# Patient Record
Sex: MALE | Race: WHITE | NOT HISPANIC OR LATINO | Employment: OTHER | URBAN - METROPOLITAN AREA
[De-identification: names, ages, dates, MRNs, and addresses within clinical notes are randomized per-mention and may not be internally consistent; named-entity substitution may affect disease eponyms.]

---

## 2020-08-25 ENCOUNTER — HOSPITAL ENCOUNTER (OUTPATIENT)
Dept: OTHER | Facility: HOSPITAL | Age: 45
Discharge: HOME | End: 2020-08-25
Attending: FAMILY MEDICINE

## 2020-08-25 ENCOUNTER — OFFICE VISIT CONVERTED (OUTPATIENT)
Dept: FAMILY MEDICINE CLINIC | Age: 45
End: 2020-08-25
Attending: FAMILY MEDICINE

## 2020-08-25 LAB
ALBUMIN SERPL-MCNC: 4.5 G/DL (ref 3.5–5)
ALBUMIN/GLOB SERPL: 1.7 {RATIO} (ref 1.4–2.6)
ALP SERPL-CCNC: 68 U/L (ref 53–128)
ALT SERPL-CCNC: 37 U/L (ref 10–40)
ANION GAP SERPL CALC-SCNC: 17 MMOL/L (ref 8–19)
APPEARANCE UR: CLEAR
AST SERPL-CCNC: 25 U/L (ref 15–50)
BACTERIA UR QL AUTO: NORMAL
BASOPHILS # BLD MANUAL: 0.02 10*3/UL (ref 0–0.2)
BASOPHILS NFR BLD MANUAL: 0.3 % (ref 0–3)
BILIRUB SERPL-MCNC: 0.26 MG/DL (ref 0.2–1.3)
BILIRUB UR QL: NEGATIVE
BUN SERPL-MCNC: 11 MG/DL (ref 5–25)
BUN/CREAT SERPL: 10 {RATIO} (ref 6–20)
CALCIUM SERPL-MCNC: 9.9 MG/DL (ref 8.7–10.4)
CASTS URNS QL MICRO: NORMAL /[LPF]
CHLORIDE SERPL-SCNC: 102 MMOL/L (ref 99–111)
CHOLEST SERPL-MCNC: 189 MG/DL (ref 107–200)
CHOLEST/HDLC SERPL: 5.1 {RATIO} (ref 3–6)
COLOR UR: YELLOW
CONV CO2: 25 MMOL/L (ref 22–32)
CONV LEUKOCYTE ESTERASE: NEGATIVE
CONV TOTAL PROTEIN: 7.1 G/DL (ref 6.3–8.2)
CONV UROBILINOGEN IN URINE BY AUTOMATED TEST STRIP: 0.2 {EHRLICHU}/DL (ref 0.1–1)
CREAT UR-MCNC: 1.05 MG/DL (ref 0.7–1.2)
DEPRECATED RDW RBC AUTO: 39.5 FL
EOSINOPHIL # BLD MANUAL: 0.05 10*3/UL (ref 0–0.7)
EOSINOPHIL NFR BLD MANUAL: 0.7 % (ref 0–7)
EPI CELLS #/AREA URNS HPF: NORMAL /[HPF]
ERYTHROCYTE [DISTWIDTH] IN BLOOD BY AUTOMATED COUNT: 12.6 % (ref 11.5–14.5)
GFR SERPLBLD BASED ON 1.73 SQ M-ARVRAT: >60 ML/MIN/{1.73_M2}
GLOBULIN UR ELPH-MCNC: 2.6 G/DL (ref 2–3.5)
GLUCOSE 24H UR-MCNC: NEGATIVE MG/DL
GLUCOSE SERPL-MCNC: 96 MG/DL (ref 70–99)
GRANS (ABSOLUTE): 4.58 10*3/UL (ref 2–8)
GRANS: 66.8 % (ref 30–85)
HBA1C MFR BLD: 15.5 G/DL (ref 14–18)
HCT VFR BLD AUTO: 47 % (ref 42–52)
HDLC SERPL-MCNC: 37 MG/DL (ref 40–60)
HGB UR QL STRIP: NEGATIVE
IMM GRANULOCYTES # BLD: 0.01 10*3/UL (ref 0–0.54)
IMM GRANULOCYTES NFR BLD: 0.1 % (ref 0–0.43)
KETONES UR QL STRIP: NEGATIVE MG/DL
LDLC SERPL CALC-MCNC: 120 MG/DL (ref 70–100)
LYMPHOCYTES # BLD MANUAL: 1.72 10*3/UL (ref 1–5)
LYMPHOCYTES NFR BLD MANUAL: 7 % (ref 3–10)
MCH RBC QN AUTO: 28 PG (ref 27–31)
MCHC RBC AUTO-ENTMCNC: 33 G/DL (ref 33–37)
MCV RBC AUTO: 85 FL (ref 80–96)
MONOCYTES # BLD AUTO: 0.48 10*3/UL (ref 0.2–1.2)
MUCOUS THREADS URNS QL MICRO: NORMAL
NITRITE UR-MCNC: NEGATIVE MG/ML
OSMOLALITY SERPL CALC.SUM OF ELEC: 289 MOSM/KG (ref 273–304)
PH UR STRIP.AUTO: 7 [PH] (ref 5–8)
PLATELET # BLD AUTO: 284 10*3/UL (ref 130–400)
PMV BLD AUTO: 9.5 FL (ref 7.4–10.4)
POTASSIUM SERPL-SCNC: 4.2 MMOL/L (ref 3.5–5.3)
PROT UR-MCNC: NEGATIVE MG/DL
RBC # BLD AUTO: 5.53 10*6/UL (ref 4.7–6.1)
RBC # BLD AUTO: NORMAL /[HPF]
SODIUM SERPL-SCNC: 140 MMOL/L (ref 135–147)
SP GR UR STRIP: 1.02 (ref 1–1.03)
SPECIMEN SOURCE: NORMAL
TESTOST SERPL-MCNC: 179 NG/DL (ref 249–836)
TRIGL SERPL-MCNC: 161 MG/DL (ref 40–150)
TSH SERPL-ACNC: 1.98 M[IU]/L (ref 0.27–4.2)
UNIDENT CRYS URNS QL MICRO: NORMAL /[HPF]
VARIANT LYMPHS NFR BLD MANUAL: 25.1 % (ref 20–45)
VLDLC SERPL-MCNC: 32 MG/DL (ref 5–37)
WBC # BLD AUTO: 6.86 10*3/UL (ref 4.8–10.8)
WBC #/AREA URNS HPF: NORMAL /[HPF]

## 2020-08-27 LAB
ASO AB SERPL-ACNC: 36 [IU]/ML (ref 0–200)
B BURGDOR IGG+IGM SER-ACNC: <0.91 ISR (ref 0–0.9)
CONV RHEUMATOID FACTOR IGM: <10 [IU]/ML (ref 0–14)
CRP SERPL-MCNC: 1.1 MG/L (ref 0–5)
DSDNA AB SER-ACNC: NEGATIVE [IU]/ML
ENA AB SER IA-ACNC: NEGATIVE {RATIO}
ERYTHROCYTE [SEDIMENTATION RATE] IN BLOOD: 2 MM/H (ref 0–20)
PHOSPHATE SERPL-MCNC: 4.3 MG/DL (ref 2.4–4.5)
URATE SERPL-MCNC: 5.8 MG/DL (ref 3.5–8.5)

## 2020-08-28 LAB
CCP IGA+IGG SERPL IA-ACNC: 6 UNITS (ref 0–19)
SARS-COV-2 AB SERPL QL IA: NEGATIVE

## 2021-01-05 ENCOUNTER — OFFICE VISIT CONVERTED (OUTPATIENT)
Dept: FAMILY MEDICINE CLINIC | Age: 46
End: 2021-01-05
Attending: FAMILY MEDICINE

## 2021-01-06 ENCOUNTER — HOSPITAL ENCOUNTER (OUTPATIENT)
Dept: PHYSICAL THERAPY | Facility: CLINIC | Age: 46
Setting detail: RECURRING SERIES
Discharge: HOME | End: 2021-03-31
Attending: FAMILY MEDICINE

## 2021-04-27 ENCOUNTER — HOSPITAL ENCOUNTER (OUTPATIENT)
Dept: OTHER | Facility: HOSPITAL | Age: 46
Discharge: HOME | End: 2021-04-27
Attending: FAMILY MEDICINE

## 2021-04-27 ENCOUNTER — OFFICE VISIT CONVERTED (OUTPATIENT)
Dept: FAMILY MEDICINE CLINIC | Age: 46
End: 2021-04-27
Attending: FAMILY MEDICINE

## 2021-04-27 LAB
ALBUMIN SERPL-MCNC: 4.6 G/DL (ref 3.5–5)
ALBUMIN/GLOB SERPL: 1.8 {RATIO} (ref 1.4–2.6)
ALP SERPL-CCNC: 62 U/L (ref 53–128)
ALT SERPL-CCNC: 32 U/L (ref 10–40)
ANION GAP SERPL CALC-SCNC: 14 MMOL/L (ref 8–19)
AST SERPL-CCNC: 25 U/L (ref 15–50)
BASOPHILS # BLD MANUAL: 0.01 10*3/UL (ref 0–0.2)
BASOPHILS NFR BLD MANUAL: 0.1 % (ref 0–3)
BILIRUB SERPL-MCNC: 0.26 MG/DL (ref 0.2–1.3)
BUN SERPL-MCNC: 13 MG/DL (ref 5–25)
BUN/CREAT SERPL: 12 {RATIO} (ref 6–20)
CALCIUM SERPL-MCNC: 9.5 MG/DL (ref 8.7–10.4)
CHLORIDE SERPL-SCNC: 103 MMOL/L (ref 99–111)
CHOLEST SERPL-MCNC: 167 MG/DL (ref 107–200)
CHOLEST/HDLC SERPL: 4.2 {RATIO} (ref 3–6)
CONV CO2: 28 MMOL/L (ref 22–32)
CONV TOTAL PROTEIN: 7.2 G/DL (ref 6.3–8.2)
CREAT UR-MCNC: 1.13 MG/DL (ref 0.7–1.2)
DEPRECATED RDW RBC AUTO: 41.9 FL
EOSINOPHIL # BLD MANUAL: 0.09 10*3/UL (ref 0–0.7)
EOSINOPHIL NFR BLD MANUAL: 1.3 % (ref 0–7)
ERYTHROCYTE [DISTWIDTH] IN BLOOD BY AUTOMATED COUNT: 13 % (ref 11.5–14.5)
ERYTHROCYTE [SEDIMENTATION RATE] IN BLOOD: 4 MM/H (ref 0–20)
FSH SERPL-ACNC: 8.8 M[IU]/ML
GFR SERPLBLD BASED ON 1.73 SQ M-ARVRAT: >60 ML/MIN/{1.73_M2}
GLOBULIN UR ELPH-MCNC: 2.6 G/DL (ref 2–3.5)
GLUCOSE SERPL-MCNC: 92 MG/DL (ref 70–99)
GRANS (ABSOLUTE): 4.71 10*3/UL (ref 2–8)
GRANS: 69.9 % (ref 30–85)
HBA1C MFR BLD: 15.2 G/DL (ref 14–18)
HCT VFR BLD AUTO: 46.3 % (ref 42–52)
HDLC SERPL-MCNC: 40 MG/DL (ref 40–60)
IMM GRANULOCYTES # BLD: 0.01 10*3/UL (ref 0–0.54)
IMM GRANULOCYTES NFR BLD: 0.1 % (ref 0–0.43)
LDLC SERPL CALC-MCNC: 102 MG/DL (ref 70–100)
LH SERPL-ACNC: 6.7 M[IU]/ML
LYMPHOCYTES # BLD MANUAL: 1.5 10*3/UL (ref 1–5)
LYMPHOCYTES NFR BLD MANUAL: 6.4 % (ref 3–10)
MCH RBC QN AUTO: 28.5 PG (ref 27–31)
MCHC RBC AUTO-ENTMCNC: 32.8 G/DL (ref 33–37)
MCV RBC AUTO: 86.9 FL (ref 80–96)
MONOCYTES # BLD AUTO: 0.43 10*3/UL (ref 0.2–1.2)
OSMOLALITY SERPL CALC.SUM OF ELEC: 290 MOSM/KG (ref 273–304)
PLATELET # BLD AUTO: 251 10*3/UL (ref 130–400)
PMV BLD AUTO: 9.1 FL (ref 7.4–10.4)
POTASSIUM SERPL-SCNC: 4.9 MMOL/L (ref 3.5–5.3)
RBC # BLD AUTO: 5.33 10*6/UL (ref 4.7–6.1)
SODIUM SERPL-SCNC: 140 MMOL/L (ref 135–147)
TESTOST SERPL-MCNC: 288 NG/DL (ref 249–836)
TRIGL SERPL-MCNC: 124 MG/DL (ref 40–150)
TSH SERPL-ACNC: 1.99 M[IU]/L (ref 0.27–4.2)
VARIANT LYMPHS NFR BLD MANUAL: 22.2 % (ref 20–45)
VLDLC SERPL-MCNC: 25 MG/DL (ref 5–37)
WBC # BLD AUTO: 6.75 10*3/UL (ref 4.8–10.8)

## 2021-04-28 LAB — SHBG SERPL-SCNC: 25.7 NMOL/L (ref 16.5–55.9)

## 2021-05-13 ENCOUNTER — HOSPITAL ENCOUNTER (OUTPATIENT)
Dept: OTHER | Facility: HOSPITAL | Age: 46
Discharge: HOME | End: 2021-05-13
Attending: FAMILY MEDICINE

## 2021-05-18 NOTE — PROGRESS NOTES
Robbin Myers  1975     Office/Outpatient Visit    Visit Date: Tue, Aug 25, 2020 12:02 pm    Provider: Collin Ford MD (Assistant: Andra Collins, )    Location: [facility]        Electronically signed by Collin Ford MD on  08/26/2020 05:41:56 PM                             Subjective:        CC: physical exam    HPI:       Robbin is in today for wellness exam.  He is 45 years old and works for himself.  He has been finding some increased issues recently with his anxiety issues.  He does not feel as well physically as he would like.  He wants to just get a good check up. He would like to feel better.    ROS:     CONSTITUTIONAL:  Positive for fatigue.   Negative for chills or fever.      EYES:  Negative for blurred vision and eye pain.      E/N/T:  Negative for nasal congestion and sore throat.      CARDIOVASCULAR:  Positive for chest pain ( with exertion ) and palpitations.      RESPIRATORY:  Positive for chronic cough.   Negative for recent cough or dyspnea.      GASTROINTESTINAL:  Negative for abdominal pain, nausea and vomiting.      GENITOURINARY:  Negative for dysuria and hematuria.      MUSCULOSKELETAL:  Positive for back pain.   Negative for arthralgias or myalgias.      INTEGUMENTARY:  Negative for atypical mole(s) and rash.      NEUROLOGICAL:  Negative for paresthesias and weakness.      HEMATOLOGIC/LYMPHATIC:  Negative for easy bruising and excessive bleeding.      ALLERGIC/IMMUNOLOGIC:  Positive for seasonal allergies and perennial allergies.      PSYCHIATRIC:  Positive for anxiety and depression.          Past Medical History / Family History / Social History:         Last Reviewed on 8/25/2020 12:20 PM by Collin Ford    Past Medical History:             PAST MEDICAL HISTORY         Hypertension     Anxiety         Surgical History:         Arthroscopy: L Knee;         Family History:     Father: Hyperlipidemia; Hypertension; Hypothyroidism;  Arrhythmia      Mother:;  Anxiety         Social History:     Occupation: [employer]     Marital Status:      Children: 1 child         Tobacco/Alcohol/Supplements:     Last Reviewed on 8/25/2020 12:20 PM by Collin Ford    Tobacco: He has never smoked.          Alcohol: Frequency: Once a week When he drinks, the average quantity of alcohol is 2-3 drinks.   He typically consumes beer or bourbon.          Substance Abuse History:     Last Reviewed on 8/25/2020 12:20 PM by Collin Ford        Mental Health History:     Last Reviewed on 8/25/2020 12:20 PM by Collin Ford        Communicable Diseases (eg STDs):     Last Reviewed on 8/25/2020 12:20 PM by Collin Ford        Current Problems:     Last Reviewed on 8/25/2020 12:20 PM by Collin Ford    None Recorded        Immunizations:     None        Allergies:     Last Reviewed on 8/25/2020 12:20 PM by Collin Ford    No Known Allergies.        Current Medications:     Last Reviewed on 8/25/2020 12:20 PM by Collin Ford    FLUOXETINE 20MG CAPSULES [TK 1 C PO QD]    LOSARTAN 100MG TABLETS [TK 1 T PO D]    AMLODIPINE BESYLATE 10MG TABLETS [TK 1 T PO QD]    ALPRAZOLAM ER 0.5MG TABLETS [TK 1 T PO 1 TO 2 TIMES A DAY]        Objective:        Vitals:         Current: 8/25/2020 12:10:22 PM    Ht:  6 ft, 3 in;  Wt: 260.4 lbs;  BMI: 32.5T: 97.1 F;  BP: 124/79 mm Hg (left arm, sitting);  P: 71 bpm (left arm (BP Cuff), sitting)        Exams:     PHYSICAL EXAM:     GENERAL: Vitals recorded well developed, well nourished;     EYES: extraocular movements intact; conjunctiva and cornea are normal; PERRL;     E/N/T: EARS:  normal external auditory canals and tympanic membranes;  grossly normal hearing; OROPHARYNX:  normal mucosa, dentition, gingiva, and posterior pharynx;     NECK: range of motion is normal; thyroid is non-palpable;     RESPIRATORY: normal respiratory rate and pattern with no distress; normal breath  sounds with no rales, rhonchi, wheezes or rubs;     CARDIOVASCULAR: normal rate; rhythm is regular;  no systolic murmur;     GASTROINTESTINAL: nontender; normal bowel sounds; no masses;     LYMPHATIC: no enlargement of cervical or facial nodes; no supraclavicular nodes;     SKIN:  no significant rashes or lesions; no suspicious moles;     MUSCULOSKELETAL: there is not any focal finding on exam - no joint redness or effusion;     NEUROLOGIC: mental status: alert and oriented x 3; cranial nerves II-XII grossly intact;     PSYCHIATRIC: affect/demeanor: depressed;  normal psychomotor function;         Lab/Test Results:         LABORATORY RESULTS: EKG performed by tls         Assessment:         Z00.00   Encounter for general adult medical examination without abnormal findings       R53.83   Other fatigue       R07.9   Chest pain, unspecified       I10   Essential (primary) hypertension       M54.5   Low back pain       Z11.59   Encounter for screening for other viral diseases           ORDERS:         Meds Prescribed:       [Refilled] amLODIPine 10 mg oral tablet [take 1 tablet (10 mg) by oral route once daily], #90 (ninety) tablets, Refills: 1 (one)       [Refilled] losartan 100 mg oral tablet [take 1 tablet (100 mg) by oral route once daily], #90 (ninety) tablets, Refills: 1 (one)         Radiology/Test Orders:       [id number]  Electrocardiogram, routine with at least 12 leads; with interpretation and report  (In-House)            46615  Radiologic exam chest 2 views  (Send-Out)            73531  Radiologic examination, spine, lumbosacral;  minimum of four views  (Send-Out)              Lab Orders:       [id number]  PHYSF - [facility] PHYSICAL: CMP, CBC, TSH, LIPID: [id number], [id number], [id number], [id number]  (Send-Out)            51129  LYSCR - [facility] Lyme Ab/Western Blot Reflex  (Send-Out)            61469  BDUAM - [facility] Urinalysis, automated, with micro  (Send-Out)            94693  TESTB - [facility] Testosterone, total  (Send-Out)            02131  RAPII -  [facility] Arthritis Profile  (Send-Out)            76260  CCPA - [facility]- CCP Antibody  (Send-Out)              Other Orders:         Depression screen positive and follow up plan documented  (In-House)                      Plan:         Encounter for general adult medical examination without abnormal findings    LABORATORY:  Labs ordered to be performed today include PHYSICAL PANEL; CMP, CBC, TSH, LIPID and Testosterone total.      RECOMMENDATIONS given include: Today, we have reviewed Robbin's care.  I'm going to recommend getting testing as noted below.  We will see what his testing shows and then follow up.  I anticipate moving ahead with some additional testing - possibly stress test..  MIPS PHQ-9 Depression Screening: Completed form scanned and in chart; Total Score 16 Positive Depression Screen: Suicide Risk Assessment completed--denies suicidal/homicidal ideation; Referral will be initated to provider qualified to treat depression           Orders:       [id number]  PHYSF - [facility] PHYSICAL: CMP, CBC, TSH, LIPID: [id number], [id number], [id number], 20711  (Send-Out)            75554  TESTB - [facility] Testosterone, total  (Send-Out)              Depression screen positive and follow up plan documented  (In-House)              Other fatigue    LABORATORY:  Labs ordered to be performed today include Lyme Ab/Western Blot Reflex.            Orders:       [id number]  LYSCR - [facility] Lyme Ab/Western Blot Reflex  (Send-Out)              Chest pain, unspecified        RADIOLOGY:  I have ordered a chest x-ray (PA and lateral) to be done today.      TESTS/PROCEDURES:  Will proceed with an ECG to be performed/scheduled now.            Orders:       [id number]  Electrocardiogram, routine with at least 12 leads; with interpretation and report  (In-House)            95144  Radiologic exam chest 2 views  (Send-Out)              Essential (primary) hypertensionAs above.          Prescriptions:       [Refilled] amLODIPine 10 mg oral tablet [take 1 tablet (10 mg) by  oral route once daily], #90 (ninety) tablets, Refills: 1 (one)       [Refilled] losartan 100 mg oral tablet [take 1 tablet (100 mg) by oral route once daily], #90 (ninety) tablets, Refills: 1 (one)         Low back pain    LABORATORY:  Labs ordered to be performed today include Arthritis Profile, CCP Antibody, and urinalysis with micro.      RADIOLOGY:  I have ordered Lumbar/Sacral Spine X-ray to be done today.            Orders:       [id number]  BDUAM - [facility] Urinalysis, automated, with micro  (Send-Out)            40482  RAPII - [facility] Arthritis Profile  (Send-Out)            49021  CCPA - [facility]- CCP Antibody  (Send-Out)            73312  Radiologic examination, spine, lumbosacral;  minimum of four views  (Send-Out)              Encounter for screening for other viral diseases          Orders: COVIG            Patient Recommendations:        For  Low back pain:    I also recommend ^.              Charge Capture:         Primary Diagnosis:     Z00.00  Encounter for general adult medical examination without abnormal findings           Orders:      [id number]  Preventive medicine, new patient, age 40-64 years  (In-House)              Depression screen positive and follow up plan documented  (In-House)              R53.83  Other fatigue     R07.9  Chest pain, unspecified           Orders:      75220  Electrocardiogram, routine with at least 12 leads; with interpretation and report  (In-House)              I10  Essential (primary) hypertension     M54.5  Low back pain     Z11.59  Encounter for screening for other viral diseases

## 2021-05-18 NOTE — PROGRESS NOTES
Robbin Myers  1975     Office/Outpatient Visit    Visit Date: Tue, Jan 5, 2021 11:10 am    Provider: Collin Ford MD (Assistant: Heather Richards MA)    Location: [facility]        Electronically signed by Collin Ford MD on  01/06/2021 07:36:54 PM                             Subjective:        CC: 'my lower back'    HPI:       Robbin is in today for follow up on his low back.  He has had ongoing problem with pain across the lower back.  It is worse on the L side.  He did have MRI some time ago and ended up seeing a back doctor in [place].  No surgery was recommended or corrective procedure.  He did no physical therapy at that time.  The pain varies in the back but it can be severe.  He does get relief with laying down.  It is 1/10 sitting here now.  His pain is worsened with certain activities - bending over, stretching the legs when seated.  He is taking some ibuprofen maybe on a weekly basis.  He says that he tries to 'gut it out'.  He does have some radiating pain to both buttocks.          Concerning essential (primary) hypertension, his current cardiac medication regimen includes a calcium channel blocker ( Norvasc ) and an angiotensin receptor blocker ( Cozaar ).  He is tolerating the medication well without side effects.  Compliance with treatment has been good; he takes his medication as directed and follows up as directed.            He has noted some sores on the mouth on the L side.  He has had some tingling on the L side of the head.  He is not sure if those are related.  He expressed some concern that he could have shingles.    ROS:     CONSTITUTIONAL:  Negative for chills and fever.      CARDIOVASCULAR:  Negative for chest pain and palpitations.      RESPIRATORY:  Negative for recent cough and dyspnea.      GASTROINTESTINAL:  Negative for abdominal pain, nausea and vomiting.      INTEGUMENTARY:  Negative for atypical mole(s) and rash.          Past Medical  History / Family History / Social History:         Last Reviewed on 8/25/2020 12:20 PM by Collin Ford    Past Medical History:             PAST MEDICAL HISTORY         Hypertension     Anxiety         Surgical History:         Arthroscopy: L Knee;         Family History:     Father: Hyperlipidemia; Hypertension; Hypothyroidism;  Arrhythmia     Mother:;  Anxiety         Social History:     Occupation: [employer]     Marital Status:      Children: 1 child         Tobacco/Alcohol/Supplements:     Last Reviewed on 8/25/2020 12:20 PM by Collin Ford    Tobacco: He has never smoked.          Alcohol: Frequency: Once a week When he drinks, the average quantity of alcohol is 2-3 drinks.   He typically consumes beer or bourbon.          Substance Abuse History:     Last Reviewed on 8/25/2020 12:20 PM by Collin Ford        Mental Health History:     Last Reviewed on 8/25/2020 12:20 PM by Collin Ford        Communicable Diseases (eg STDs):     Last Reviewed on 8/25/2020 12:20 PM by Collin Ford        Current Problems:     Last Reviewed on 8/25/2020 12:20 PM by Collin Ford    Essential (primary) hypertension    Low back pain    Chest pain, unspecified    Other fatigue    Encounter for general adult medical examination without abnormal findings    Encounter for screening for other viral diseases    Testicular hypofunction        Immunizations:     None        Allergies:     Last Reviewed on 1/05/2021 11:12 AM by Heather Richards    No Known Allergies.        Current Medications:     Last Reviewed on 1/05/2021 11:13 AM by Heather Richards    FLUOXETINE 20MG CAPSULES  [TK 1 C PO QD]    losartan 100 mg oral tablet [take 1 tablet (100 mg) by oral route once daily]    amLODIPine 10 mg oral tablet [take 1 tablet (10 mg) by oral route once daily]    ALPRAZOLAM ER 0.5MG TABLETS  [TK 1 T PO 1 TO 2 TIMES A DAY]    DULOXETINE DR 30MG CAPSULES [TK 1 C PO 1 TIME A  DAY]        Objective:        Vitals:         Current: 1/5/2021 11:13:38 AM    Ht:  6 ft, 3 in;  Wt: 263.4 lbs;  BMI: 32.9T: 97.1 F (temporal);  BP: 144/91 mm Hg (left arm, sitting);  P: 102 bpm (left arm (BP Cuff), sitting);  sCr: 1.05 mg/dL;  GFR: 104.84        Repeat:     11:39:27 AM  BP:   143/83mm Hg (left arm, sitting, HR: 97)     Exams:     PHYSICAL EXAM:     GENERAL: Vitals recorded well developed, well nourished;     EYES: extraocular movements intact; conjunctiva and cornea are normal; PERRL;     E/N/T: EARS:  normal external auditory canals and tympanic membranes;  grossly normal hearing; OROPHARYNX:  normal mucosa, dentition, gingiva, and posterior pharynx;     NECK: range of motion is normal; thyroid is non-palpable;     RESPIRATORY: normal respiratory rate and pattern with no distress; normal breath sounds with no rales, rhonchi, wheezes or rubs;     CARDIOVASCULAR: normal rate; rhythm is regular;  no systolic murmur;     GASTROINTESTINAL: nontender; normal bowel sounds; no masses;     LYMPHATIC: no enlargement of cervical or facial nodes; no supraclavicular nodes;     SKIN:  no significant rashes or lesions; no suspicious moles;     MUSCULOSKELETAL: there is decreased ROM in the lower back on exam - no focal finding is noted - no pain in the mid line;     NEUROLOGIC: mental status: alert and oriented x 3; cranial nerves II-XII grossly intact;     PSYCHIATRIC: appropriate affect and demeanor; normal psychomotor function;         Assessment:         M54.5   Low back pain       I10   Essential (primary) hypertension       R20.2   Paresthesia of skin       Z23   Encounter for immunization       R07.9   Chest pain, unspecified       E29.1   Testicular hypofunction           ORDERS:         Meds Prescribed:       [New Rx] meloxicam 15 mg oral tablet [take 1 tablet (15 mg) by oral route once daily with food], #30 (thirty) tablets, Refills: 0 (zero)       [New Rx] cyclobenzaprine 10 mg oral tablet [take 1  tablet (10 mg) by oral route once daily at bedtime as needed], #30 (thirty) tablets, Refills: 0 (zero)       [Refilled] amLODIPine 10 mg oral tablet [take 1 tablet (10 mg) by oral route once daily], #90 (ninety) tablets, Refills: 1 (one)       [Refilled] losartan 100 mg oral tablet [take 1 tablet (100 mg) by oral route once daily], #90 (ninety) tablets, Refills: 1 (one)         Procedures Ordered:       [id number]  Immunization administration; one vaccine  (In-House)            RFPT  Physical/Occupational Therapy Referral  (Send-Out)              Other Orders:       [id number]  Influenza virus vaccine, quadrivalent, split virus, preservative free 3 years of age & older  (In-House)                      Plan:         Low back pain        REFERRALS:  Referral initiated to physical therapy ( [facility] ) for evaluation and treatment.      RECOMMENDATIONS given include: Today, we have again reviewed his care.  I'm going to recommend referral to physical therapy.  We will also give some meloxicam and Flexeril for the relative near term.  We may consider repeat imaging, but we will reach out for a copy of his last MRI..          ADDENDUM - At the end of the visit yesterday, I told Robbin I would review his chart regarding his labs and follow up.  His testosterone level was a bit low when checked in October.  I would recommend repeat fasting labs as per the order note from October.  However, let's also repeat his lipid panel at this time as below.  I'd like to see where those numbers stand.  Also, his EKG looked essentially normal, but we had some discussion at his visit about doing a stress test.  If he is desiring to move forward with that, then please arrange plain stress test at this time.  I would advise moving forward with physical therapy as we discussed.  We will be in touch as well after I can review the MRI he had done.  Thanks. - Collin Ford MD - 1/6/21 - 08:351/6/21 11:49am pt inf, states he  will move forward with stress test if you are recommending he do so, otherwise he will hold off for now/th          Prescriptions:       [New Rx] meloxicam 15 mg oral tablet [take 1 tablet (15 mg) by oral route once daily with food], #30 (thirty) tablets, Refills: 0 (zero)       [New Rx] cyclobenzaprine 10 mg oral tablet [take 1 tablet (10 mg) by oral route once daily at bedtime as needed], #30 (thirty) tablets, Refills: 0 (zero)       [Refilled] amLODIPine 10 mg oral tablet [take 1 tablet (10 mg) by oral route once daily], #90 (ninety) tablets, Refills: 1 (one)       [Refilled] losartan 100 mg oral tablet [take 1 tablet (100 mg) by oral route once daily], #90 (ninety) tablets, Refills: 1 (one)           Orders:       RFPT  Physical/Occupational Therapy Referral  (Send-Out)              Essential (primary) hypertensionAs above.        Paresthesia of skinAs above.        Encounter for immunization          Immunizations:       73695  Immunization administration; one vaccine  (In-House)            98825  Influenza virus vaccine, quadrivalent, split virus, preservative free 3 years of age & older  (In-House)                Dose (ml): 0.5  Site: right deltoid  Route: intramuscular  Administered by: Heather Richards          : [employer]  Lot #: [id number]  Exp: 06/30/2021          NDC: [address]-81        Chest pain, unspecifiedAs above.        Testicular hypofunctionAs above.            Charge Capture:         Primary Diagnosis:     M54.5  Low back pain           Orders:      69158  Office/outpatient visit; established patient, level 4  (In-House)              I10  Essential (primary) hypertension     R20.2  Paresthesia of skin     Z23  Encounter for immunization           Orders:      55271  Immunization administration; one vaccine  (In-House)            16677  Influenza virus vaccine, quadrivalent, split virus, preservative free 3 years of age & older  (In-House)              R07.9  Chest pain,  unspecified     E29.1  Testicular hypofunction         ADDENDUMS:      ____________________________________    Addendum: 01/06/2021 07:39 PM - Collin Ford        With regard to stress test, I would recommend moving ahead with plain treadmill stress test if he is having chest pain with exertion.  Thanks.        Addendum: 01/07/2021 11:57 AM - Four, Team        forwarded to [facility]/        Addendum: 01/09/2021 07:51 AM - Collin Ford        Please let Robbin know that I have reviewed his most recent MRI from [facility].  He had more significant low back disease than I anticipated with some pinching of the spinal cord in the very low back.  Given his ongoing pain - possibly worsening pain - I would advise repeat MRI at this time to evaluate.  I want to rule out obvious progression of the findings.  It would be best to arrange the follow up MRI at [facility] for easier comparison by radiology.  Thanks.        Addendum: 01/12/2021 10:32 AM - Four, Team        lmovm inf pt, forwarded to [facility]/

## 2021-05-18 NOTE — PROGRESS NOTES
Robbin Myers  1975     Office/Outpatient Visit    Visit Date: Tue, Apr 27, 2021 11:50 am    Provider: Collin Ford MD (Assistant: Rebeca Bauman,  )    Location: [facility]        Electronically signed by Collin Ford MD on  04/28/2021 10:48:38 AM                             Subjective:        CC: I've been having trouble with focus'    HPI:       Robbin is in today for evaluation of some ongoing symptoms.  He has recently seen his psychiatrist recently and says that he had MRI recommended as a precaution.  He has been sleeping more than normal.  He has had headaches and trouble focusing - 'big time'.  He has also been having some struggle with just overall function because of how he is feeling.  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        With regard to headaches, he has been having them off and on for years.  Location of pain - center and top of head.  Frequency - every couple of days recently.  This is a change for him.  Duration - up to 5-6 hours.  Description of pain - steady - not throbbing - miserable.  Intensity - 6/10.  Radiation of pain - none.  Associated symptoms - depressed and anxious mood, some blurring of vision.  Aggravating factors - noise - wants to get away from all noise, stress.  Alleviating factors - taking a pain reliever can help.          In regard to the essential (primary) hypertension, his current cardiac medication regimen includes a calcium channel blocker ( Norvasc ) and an angiotensin receptor blocker ( Cozaar ).  He is tolerating the medication well without side effects.  Compliance with treatment has been good; he takes his medication as directed and follows up as directed.      ROS:     CONSTITUTIONAL:  Negative for chills and fever.      CARDIOVASCULAR:  Negative for chest pain and palpitations.      RESPIRATORY:  Negative for recent cough and dyspnea.      GASTROINTESTINAL:   Negative for abdominal pain, nausea and vomiting.      INTEGUMENTARY:  Negative for atypical mole(s) and rash.          Past Medical History / Family History / Social History:         Last Reviewed on 4/27/2021 12:24 PM by Collin Ford    Past Medical History:             PAST MEDICAL HISTORY         Hypertension     Anxiety         Surgical History:         Arthroscopy: L Knee;         Family History:     Father: Hyperlipidemia; Hypertension; Hypothyroidism;  Arrhythmia     Mother:;  Anxiety         Social History:     Occupation: [employer]     Marital Status:      Children: 1 child         Tobacco/Alcohol/Supplements:     Last Reviewed on 4/27/2021 12:24 PM by Collin Ford    Tobacco: He has never smoked.          Alcohol: Frequency: Once a week When he drinks, the average quantity of alcohol is 2-3 drinks.   He typically consumes beer or bourbon.          Substance Abuse History:     Last Reviewed on 4/27/2021 12:24 PM by Collin Ford        Mental Health History:     Last Reviewed on 4/27/2021 12:24 PM by Collin Ford        Communicable Diseases (eg STDs):     Last Reviewed on 4/27/2021 12:24 PM by Collin Ford        Current Problems:     Last Reviewed on 4/27/2021 12:24 PM by Collin Ford    Essential (primary) hypertension    Encounter for general adult medical examination without abnormal findings    Encounter for screening for other viral diseases    Testicular hypofunction    Encounter for immunization    Paresthesia of skin    Headache, unspecified    Other fatigue    Mixed hyperlipidemia        Immunizations:     influenza, injectable, quadrivalent, preservative free (FLUZONE QUAD [id number]) 1/5/2021    COVID-19, mRNA, LNP-S, PF, 30 mcg/0.3 mL dose 9/14/2020    COVID-19, mRNA, LNP-S, PF, 30 mcg/0.3 mL dose 10/14/2020        Allergies:     Last Reviewed on 4/27/2021 12:24 PM by Collin Ford    No Known Allergies.         Current Medications:     Last Reviewed on 4/27/2021 12:24 PM by Collin Ford    FLUOXETINE 20MG CAPSULES  [TK 1 C PO QD]    losartan 100 mg oral tablet [take 1 tablet (100 mg) by oral route once daily]    amLODIPine 10 mg oral tablet [take 1 tablet (10 mg) by oral route once daily]    ALPRAZOLAM ER 0.5MG TABLETS  [TK 1 T PO 1 TO 2 TIMES A DAY]    meloxicam 15 mg oral tablet [TAKE 1 TABLET(15 MG) BY MOUTH EVERY DAY WITH FOOD]    lamotrigine 25 mg tablet        Objective:        Vitals:         Current: 4/27/2021 11:53:09 AM    Ht:  6 ft, 3 in;  Wt: 260.2 lbs;  BMI: 32.5T: 97.4 F (temporal);  BP: 123/82 mm Hg (left arm, sitting);  P: 76 bpm (left arm (BP Cuff), sitting);  sCr: 1.05 mg/dL;  GFR: 104.29        Exams:     PHYSICAL EXAM:     GENERAL: Vitals recorded well developed, well nourished;     EYES: extraocular movements intact; conjunctiva and cornea are normal; PERRL;     E/N/T: EARS:  normal external auditory canals and tympanic membranes;  grossly normal hearing; OROPHARYNX:  normal mucosa, dentition, gingiva, and posterior pharynx;     NECK: range of motion is normal; thyroid is non-palpable;     RESPIRATORY: normal respiratory rate and pattern with no distress; normal breath sounds with no rales, rhonchi, wheezes or rubs;     CARDIOVASCULAR: normal rate; rhythm is regular;  no systolic murmur;     GASTROINTESTINAL: nontender; normal bowel sounds; no masses;     LYMPHATIC: no enlargement of cervical or facial nodes; no supraclavicular nodes;     SKIN:  no significant rashes or lesions; no suspicious moles;     NEUROLOGIC: mental status: alert and oriented x 3; cranial nerves II-XII grossly intact;     PSYCHIATRIC: appropriate affect and demeanor; normal psychomotor function;         Assessment:         R51.9   Headache, unspecified       R53.83   Other fatigue       I10   Essential (primary) hypertension       E29.1   Testicular hypofunction       E78.2   Mixed hyperlipidemia           ORDERS:          Radiology/Test Orders:       34175  MRI brain with and without contrast  (Send-Out)              Lab Orders:       [id number]  BDCBC - H CBC with 3 part diff  (Send-Out)    [facility]        06569  SED - [facility] Sedimentation rate, non-automated ESR  (Send-Out)    LUCILA Lee [facility]        00321  FSH - [facility] Follicle stimulating hormone  (Send-Out)     KATHLEEN HERNANDEZ MD - PSYCHIATRY        25051  LH - [facility] Luteinizing hormone (LH)  (Send-Out)    LUCILA HERNANDEZ MD - PSYCHIATRY        07376  TESTB - [facility] Testosterone, total  (Send-Out)    LUCILA HERNANDEZ MD - PSYCHIATRY        24184  Sex hormone binding globulin  (Send-Out)    LUCILA HERNANDEZ MD - PSYCHIATRY        04736  HTNLP - [facility] CMP AND LIPID: [id number], [id number]  (Send-Out)    [facility]UCIL[facility]        17348  TSH - [facility] TSH  (Send-Out)    [facility]                  Plan:         Headache, unspecified    LABORATORY:  Labs ordered to be performed today include CBC and ESR.      RECOMMENDATIONS given include: Today, we have reviewed Robbin's care.  I'm going to recommend no specific changes in his medications.  We will arrange additional testing as noted below.  MRI to be arranged regarding the headache..            Orders:       53026  MRI brain with and without contrast  (Send-Out)            68943  BDCBC - H CBC with 3 part diff  (Send-Out)    LUCILA Lee [facility]        00784  SED - [facility] Sedimentation rate, non-automated ESR  (Send-Out)    LUCILA Lee [facility]          Other fatigueAs above.        Essential (primary) hypertensionAs above.        Testicular hypofunction    LABORATORY:  Labs ordered to be performed today include FSH, Luteinizing Hormone, and Testosterone total.            Orders:       56727  FSH - [facility] Follicle stimulating hormone  (Send-Out)    LUCILA HERNANDEZ MD - PSYCHIATRY        02859  LH - [facility] Luteinizing hormone (LH)  (Send-Out)    LUCILA HERNANDEZ MD - PSYCHIATRY         19499  TESTB - [facility] Testosterone, total  (Send-Out)    CC KATHLEEN Lee PSY[facility]        02157  Sex hormone binding globulin  (Send-Out)    LUCILA HOWARD          Mixed hyperlipidemia    LABORATORY:  Labs ordered to be performed today include HTN/Lipid Panel: CMP, Lipid and TSH.            Orders:       77810  HTNLP - [facility] CMP AND LIPID: [id number], 87977  (Send-Out)    LUCILA Lee [facility]        15044  TSH - [facility] TSH  (Send-Out)    LUCILA HOWARD              Charge Capture:         Primary Diagnosis:     R51.9  Headache, unspecified           Orders:      35145  Office/outpatient visit; established patient, level 4  (In-House)              R53.83  Other fatigue     I10  Essential (primary) hypertension     E29.1  Testicular hypofunction     E78.2  Mixed hyperlipidemia

## 2021-07-02 VITALS
WEIGHT: 260.4 LBS | TEMPERATURE: 97.1 F | DIASTOLIC BLOOD PRESSURE: 79 MMHG | HEIGHT: 75 IN | BODY MASS INDEX: 32.38 KG/M2 | SYSTOLIC BLOOD PRESSURE: 124 MMHG | HEART RATE: 71 BPM

## 2021-07-02 VITALS
TEMPERATURE: 97.1 F | HEIGHT: 75 IN | WEIGHT: 263.4 LBS | BODY MASS INDEX: 32.75 KG/M2 | HEART RATE: 102 BPM | SYSTOLIC BLOOD PRESSURE: 143 MMHG | DIASTOLIC BLOOD PRESSURE: 83 MMHG

## 2021-07-02 VITALS
BODY MASS INDEX: 32.35 KG/M2 | HEART RATE: 76 BPM | SYSTOLIC BLOOD PRESSURE: 123 MMHG | WEIGHT: 260.2 LBS | TEMPERATURE: 97.4 F | DIASTOLIC BLOOD PRESSURE: 82 MMHG | HEIGHT: 75 IN

## 2021-08-06 ENCOUNTER — OFFICE VISIT (OUTPATIENT)
Dept: FAMILY MEDICINE CLINIC | Age: 46
End: 2021-08-06

## 2021-08-06 ENCOUNTER — LAB (OUTPATIENT)
Dept: LAB | Facility: HOSPITAL | Age: 46
End: 2021-08-06

## 2021-08-06 VITALS
WEIGHT: 264.4 LBS | DIASTOLIC BLOOD PRESSURE: 73 MMHG | HEIGHT: 75 IN | HEART RATE: 79 BPM | BODY MASS INDEX: 32.87 KG/M2 | SYSTOLIC BLOOD PRESSURE: 108 MMHG | TEMPERATURE: 98.1 F | OXYGEN SATURATION: 98 %

## 2021-08-06 DIAGNOSIS — S30.861D INSECT BITE OF ABDOMINAL WALL, SUBSEQUENT ENCOUNTER: ICD-10-CM
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
## 2021-08-06 DIAGNOSIS — R53.83 OTHER FATIGUE: ICD-10-CM

## 2021-08-06 DIAGNOSIS — S30.861D INSECT BITE OF ABDOMINAL WALL, SUBSEQUENT ENCOUNTER: Primary | ICD-10-CM

## 2021-08-06 LAB
25(OH)D3 SERPL-MCNC: 27.5 NG/ML
ALBUMIN SERPL-MCNC: 4.5 G/DL (ref 3.5–5.2)
ALBUMIN/GLOB SERPL: 2.1 G/DL
ALP SERPL-CCNC: 60 U/L (ref 39–117)
ALT SERPL W P-5'-P-CCNC: 27 U/L (ref 1–41)
ANION GAP SERPL CALCULATED.3IONS-SCNC: 7.7 MMOL/L (ref 5–15)
AST SERPL-CCNC: 18 U/L (ref 1–40)
BILIRUB SERPL-MCNC: 0.4 MG/DL (ref 0–1.2)
BUN SERPL-MCNC: 16 MG/DL (ref 6–20)
BUN/CREAT SERPL: 17.4 (ref 7–25)
CALCIUM SPEC-SCNC: 9.1 MG/DL (ref 8.6–10.5)
CHLORIDE SERPL-SCNC: 106 MMOL/L (ref 98–107)
CO2 SERPL-SCNC: 25.3 MMOL/L (ref 22–29)
CREAT SERPL-MCNC: 0.92 MG/DL (ref 0.76–1.27)
DEPRECATED RDW RBC AUTO: 40.1 FL (ref 37–54)
ERYTHROCYTE [DISTWIDTH] IN BLOOD BY AUTOMATED COUNT: 13 % (ref 12.3–15.4)
GFR SERPL CREATININE-BSD FRML MDRD: 89 ML/MIN/1.73
GLOBULIN UR ELPH-MCNC: 2.1 GM/DL
GLUCOSE SERPL-MCNC: 118 MG/DL (ref 65–99)
HCT VFR BLD AUTO: 44.4 % (ref 37.5–51)
HGB BLD-MCNC: 14.8 G/DL (ref 13–17.7)
MCH RBC QN AUTO: 28.4 PG (ref 26.6–33)
MCHC RBC AUTO-ENTMCNC: 33.3 G/DL (ref 31.5–35.7)
MCV RBC AUTO: 85.1 FL (ref 79–97)
PLATELET # BLD AUTO: 251 10*3/MM3 (ref 140–450)
PMV BLD AUTO: 9.3 FL (ref 6–12)
POTASSIUM SERPL-SCNC: 4.1 MMOL/L (ref 3.5–5.2)
PROT SERPL-MCNC: 6.6 G/DL (ref 6–8.5)
RBC # BLD AUTO: 5.22 10*6/MM3 (ref 4.14–5.8)
SODIUM SERPL-SCNC: 139 MMOL/L (ref 136–145)
WBC # BLD AUTO: 7.02 10*3/MM3 (ref 3.4–10.8)

## 2021-08-06 PROCEDURE — 80053 COMPREHEN METABOLIC PANEL: CPT

## 2021-08-06 PROCEDURE — 85027 COMPLETE CBC AUTOMATED: CPT

## 2021-08-06 PROCEDURE — 36415 COLL VENOUS BLD VENIPUNCTURE: CPT

## 2021-08-06 PROCEDURE — 99213 OFFICE O/P EST LOW 20 MIN: CPT | Performed by: NURSE PRACTITIONER

## 2021-08-06 PROCEDURE — 86038 ANTINUCLEAR ANTIBODIES: CPT

## 2021-08-06 PROCEDURE — 86617 LYME DISEASE ANTIBODY: CPT

## 2021-08-06 PROCEDURE — 86757 RICKETTSIA ANTIBODY: CPT

## 2021-08-06 PROCEDURE — 82306 VITAMIN D 25 HYDROXY: CPT

## 2021-08-06 PROCEDURE — 86225 DNA ANTIBODY NATIVE: CPT

## 2021-08-06 RX ORDER — LAMOTRIGINE 25 MG/1
TABLET ORAL
COMMUNITY
Start: 2021-04-26 | End: 2021-08-06

## 2021-08-06 RX ORDER — FLUOXETINE HYDROCHLORIDE 20 MG/1
20 CAPSULE ORAL DAILY
COMMUNITY
Start: 2021-06-14 | End: 2021-12-07

## 2021-08-06 RX ORDER — LOSARTAN POTASSIUM 100 MG/1
1 TABLET ORAL DAILY
COMMUNITY
Start: 2021-06-06 | End: 2021-09-02

## 2021-08-06 RX ORDER — ALPRAZOLAM 0.5 MG/1
1-2 TABLET, EXTENDED RELEASE ORAL DAILY PRN
COMMUNITY
Start: 2021-06-05

## 2021-08-06 RX ORDER — DOXYCYCLINE HYCLATE 100 MG/1
100 CAPSULE ORAL 2 TIMES DAILY
Qty: 20 CAPSULE | Refills: 0 | Status: SHIPPED | OUTPATIENT
Start: 2021-08-06 | End: 2021-08-16

## 2021-08-06 RX ORDER — PROPRANOLOL HYDROCHLORIDE 20 MG/1
1 TABLET ORAL DAILY
COMMUNITY
Start: 2021-05-26 | End: 2021-12-07

## 2021-08-06 RX ORDER — AMLODIPINE BESYLATE 10 MG/1
1 TABLET ORAL DAILY
COMMUNITY
Start: 2021-06-06 | End: 2021-09-10

## 2021-08-06 RX ORDER — LAMOTRIGINE 200 MG/1
200 TABLET ORAL DAILY
COMMUNITY
Start: 2021-05-26 | End: 2021-12-07

## 2021-08-06 NOTE — PROGRESS NOTES
"Chief Complaint  Insect Bite (tick 4wks ago, really sick 2 wks ago (fever, chills, fatigue, foggy brain, drowzy)) and Fatigue (currently still extremely drowzy, \"hard to think straight\")    Subjective          Robbin Myers presents to [facility]  He had a tick bite 4 weeks ago, and then traveled to [place] a few weeks after that.  He did have a 102 degree fever and went urgent care and was treated with doxycycline x 10 days. He is still having fatigue, foggy brain, and drowsiness.  He denies fever, arthralgia, and myalgia currently.  He denies N/V. He had a rash left lateral abdomen, but not a generalized rash.  He is taking his fluoxetine, lamictal, and Xanax prn.        Objective   Vital Signs:   /73   Pulse 79   Temp 98.1 °F (36.7 °C)   Ht 190.5 cm (75\")   Wt 120 kg (264 lb 6.4 oz)   SpO2 98%   BMI 33.05 kg/m²     Physical Exam  Constitutional:       General: He is not in acute distress.     Appearance: Normal appearance. He is obese.   HENT:      Head: Normocephalic.   Eyes:      Pupils: Pupils are equal, round, and reactive to light.      Visual Fields: Right eye visual fields normal and left eye visual fields normal.   Neck:      Trachea: Trachea normal.   Cardiovascular:      Rate and Rhythm: Normal rate and regular rhythm.      Heart sounds: Normal heart sounds.   Pulmonary:      Effort: Pulmonary effort is normal.      Breath sounds: Normal breath sounds and air entry.   Musculoskeletal:      Right lower leg: No edema.      Left lower leg: No edema.   Skin:     General: Skin is warm and dry.      Findings: Lesion (Left lateral abdomen- small open wound with slight erythema surrounding it) present.   Neurological:      Mental Status: He is alert and oriented to person, place, and time.   Psychiatric:         Mood and Affect: Mood and affect normal.         Behavior: Behavior normal.         Thought Content: Thought content normal.        Result Review : "                 Assessment and Plan    Diagnoses and all orders for this visit:    1. Insect bite of abdominal wall, subsequent encounter (Primary)  -     doxycycline (VIBRAMYCIN) 100 MG capsule; Take 1 capsule by mouth 2 (Two) Times a Day for 10 days.  Dispense: 20 capsule; Refill: 0  -     Hymera SF (IgG / M); Future  -     Lyme Disease, Western Blot; Future    2. Other fatigue  -     doxycycline (VIBRAMYCIN) 100 MG capsule; Take 1 capsule by mouth 2 (Two) Times a Day for 10 days.  Dispense: 20 capsule; Refill: 0  -     Hymera SF (IgG / M); Future  -     Lyme Disease, Western Blot; Future  -     DAR; Future  -     Vitamin D 25 Hydroxy; Future  -     CBC (No Diff); Future  -     Comprehensive Metabolic Panel; Future    Will try another round of doxycyline.  Will test for [place] Spotted fever and lyme disease.  Will also obtain DAR, Vit D, CBC, and CMP.    Follow Up   Return if symptoms worsen or fail to improve.  Patient was given instructions and counseling regarding his condition or for health maintenance advice. Please see specific information pulled into the AVS if appropriate.

## 2021-08-06 NOTE — PATIENT INSTRUCTIONS
Insect Bite, Adult  An insect bite can make your skin red, itchy, and swollen. An insect bite is different from an insect sting, which happens when an insect injects poison (venom) into the skin.  Some insects can spread disease to people through a bite. However, most insect bites do not lead to disease and are not serious.  What are the causes?  Insects may bite for a variety of reasons, including:  · Hunger.  · To defend themselves.  Insects that bite include:  · Spiders.  · Mosquitoes.  · Ticks.  · Fleas.  · Ants.  · Flies.  · Kissing bugs.  · Chiggers.  What are the signs or symptoms?  Symptoms of this condition include:  · Itching or pain in the bite area.  · Redness and swelling in the bite area.  · An open wound (skin ulcer).  In many cases, symptoms last for 2-4 days.  In rare cases, a person may have a severe allergic reaction (anaphylactic reaction) to a bite. Symptoms of an anaphylactic reaction may include:  · Feeling warm in the face (flushed). This may include redness.  · Itchy, red, swollen areas of skin (hives).  · Swelling of the eyes, lips, face, mouth, tongue, or throat.  · Difficulty breathing, speaking, or swallowing.  · Noisy breathing (wheezing).  · Dizziness or light-headedness.  · Fainting.  · Pain or cramping in the abdomen.  · Vomiting.  · Diarrhea.  How is this diagnosed?  This condition is usually diagnosed based on symptoms and a physical exam.  How is this treated?  Treatment is usually not needed. Symptoms often go away on their own. When treatment is recommended, it may involve:  · Applying a cream or lotion to the bite area. This treatment helps with itching.  · Taking an antibiotic medicine. This treatment is needed if the bite area gets infected.  · Getting a tetanus shot, if you are not up to date on this vaccine.  · Applying ice to the affected area.  · Allergy medicines called antihistamines. This treatment may be needed if you develop itching or an allergic reaction to the  "insect bite.  · Giving yourself an epinephrine injection if you have an anaphylactic reaction to a bite. To give the injection, you will use what is commonly called an auto-injector \"pen\" (pre-filled automatic epinephrine injection device). Your health care provider will teach you how to use an auto-injector pen.  Follow these instructions at home:  Bite area care    · Do not scratch the bite area.  · Keep the bite area clean and dry. Wash it every day with soap and water as told by your health care provider.  · Check the bite area every day for signs of infection. Check for:  ? Redness, swelling, or pain.  ? Fluid or blood.  ? Warmth.  ? Pus or a bad smell.  Managing pain, itching, and swelling    · You may apply cortisone cream, calamine lotion, or a paste made of baking soda and water to the bite area as told by your health care provider.  · If directed, put ice on the bite area.  ? Put ice in a plastic bag.  ? Place a towel between your skin and the bag.  ? Leave the ice on for 20 minutes, 2-3 times a day.  General instructions  · Apply or take over-the-counter and prescription medicines only as told by your health care provider.  · If you were prescribed an antibiotic medicine, take or apply it as told by your health care provider. Do not stop using the antibiotic even if your condition improves.  · Keep all follow-up visits as told by your health care provider. This is important.  How is this prevented?  To help reduce your risk of insect bites:  · When you are outdoors, wear clothing that covers your arms and legs. This is especially important in the early morning and evening.  · Use insect repellent. The best insect repellents contain DEET, picaridin, oil of lemon eucalyptus (OLE), or PM7116.  · Consider spraying your clothing with a pesticide called permethrin. Permethrin helps prevent insect bites. It works for several weeks and for up to 5-6 clothing washes. Do not apply permethrin directly to the " skin.  · If your home windows do not have screens, consider installing them.  · If you will be sleeping in an area where there are mosquitoes, consider covering your sleeping area with a mosquito net.  Contact a health care provider if:  · You have redness, swelling, or pain in the bite area.  · You have fluid or blood coming from the bite area.  · The bite area feels warm to the touch.  · You have pus or a bad smell coming from the bite area.  · You have a fever.  Get help right away if:  · You have joint pain.  · You have a rash.  · You feel unusually tired or sleepy.  · You have neck pain.  · You have a headache.  · You have unusual weakness.  · You develop symptoms of an anaphylactic reaction. These may include:  ? Flushed skin.  ? Hives.  ? Swelling of the eyes, lips, face, mouth, tongue, or throat.  ? Difficulty breathing, speaking, or swallowing.  ? Wheezing.  ? Dizziness or light-headedness.  ? Fainting.  ? Pain or cramping in the abdomen.  ? Vomiting.  ? Diarrhea.  These symptoms may represent a serious problem that is an emergency. Do not wait to see if the symptoms will go away. Do the following right away:  · Use the auto-injector pen as you have been instructed.  · Get medical help. Call your local emergency services (911 in the [place] Do not drive yourself to the hospital.  Summary  · An insect bite can make your skin red, itchy, and swollen.  · Treatment is usually not needed. Symptoms often go away on their own. When treatment is recommended, it may involve taking medicine, applying medicine to the area, or applying ice.  · Apply or take over-the-counter and prescription medicines only as told by your health care provider.  · Use insect repellent to help prevent insect bites.  · Contact a health care provider if you have any signs of infection in the bite area.  This information is not intended to replace advice given to you by your health care provider. Make sure you discuss any questions you have  with your health care provider.  Document Revised: 06/28/2019 Document Reviewed: 06/28/2019  Elsevier Patient Education © 2021 [employer]

## 2021-08-10 LAB
DSDNA IGG SERPL IA-ACNC: NEGATIVE [IU]/ML
NUCLEAR IGG SER IA-RTO: NEGATIVE

## 2021-08-12 LAB

## 2021-08-23 ENCOUNTER — HOSPITAL ENCOUNTER (EMERGENCY)
Dept: HOSPITAL 49 - FER | Age: 46
Discharge: HOME | End: 2021-08-23
Payer: COMMERCIAL

## 2021-08-23 DIAGNOSIS — L42: Primary | ICD-10-CM

## 2021-08-23 DIAGNOSIS — I10: ICD-10-CM

## 2021-08-23 DIAGNOSIS — Z79.899: ICD-10-CM

## 2021-08-23 LAB
ALBUMIN SERPL-MCNC: 4 G/DL (ref 3.4–5)
ALKALINE PHOSHATASE: 54 U/L (ref 46–116)
ALT SERPL-CCNC: 44 U/L (ref 16–63)
AST: 23 U/L (ref 15–37)
BASOPHIL: 0.2 % (ref 0–2)
BILIRUBIN - TOTAL: 0.3 MG/DL (ref 0.2–1)
BUN SERPL-MCNC: 11 MG/DL (ref 7–18)
BUN/CREAT RATIO (CALC): 9.9 RATIO
CHLORIDE: 102 MMOL/L (ref 98–107)
CO2 (BICARBONATE): 25 MMOL/L (ref 21–32)
CREATININE: 1.11 MG/DL (ref 0.67–1.17)
EOSINOPHIL: 1.1 % (ref 0–5)
GLOBULIN (CALCULATION): 2.9 G/DL
GLUCOSE SERPL-MCNC: 170 MG/DL (ref 74–106)
HCT: 43.5 % (ref 42–52)
HGB BLD-MCNC: 14.2 G/DL (ref 13.2–18)
LYMPHOCYTE: 37.3 % (ref 15–48)
MCH RBC QN AUTO: 28.4 PG (ref 25–31)
MCHC RBC AUTO-ENTMCNC: 32.6 G/DL (ref 32–36)
MCV: 87 FL (ref 78–100)
MONOCYTE: 7 % (ref 0–12)
MPV: 9.7 FL (ref 6–9.5)
NEUTROPHIL: 54.3 % (ref 41–80)
NRBC: 0
PLT: 237 K/UL (ref 150–400)
POTASSIUM: 3.7 MMOL/L (ref 3.5–5.1)
RBC MORPHOLOGY: NORMAL
RBC: 5 M/UL (ref 4.7–6)
RDW: 12.8 % (ref 11.5–14)
TOTAL PROTEIN: 6.9 G/DL (ref 6.4–8.2)
WBC: 8.5 K/UL (ref 4–10.5)

## 2021-08-25 ENCOUNTER — TELEPHONE (OUTPATIENT)
Dept: FAMILY MEDICINE CLINIC | Age: 46
End: 2021-08-25

## 2021-08-25 NOTE — TELEPHONE ENCOUNTER
Pt calling today to state he has a place on his abd. To the right of his navel that is red with a halo around it. He stated he went to the ER on Sunday and they dx him with Pityriasis rosea. On Monday he woke up and it was much larger, the itching has since stopped but he is concerned with all of his fatigue that this was misdiagnosed. Pt is going to try and upload a picture of this to send to pcp to review via [employer]. Inf pt to draw a [place] around area with marker and will request records from [facility].

## 2021-08-27 LAB
R RICKETTSI IGG SER QL IA: NEGATIVE
R RICKETTSI IGM SER-ACNC: 0.28 INDEX (ref 0–0.89)

## 2021-09-02 RX ORDER — LOSARTAN POTASSIUM 100 MG/1
TABLET ORAL
Qty: 90 TABLET | Refills: 0 | Status: SHIPPED | OUTPATIENT
Start: 2021-09-02 | End: 2021-11-29

## 2021-09-10 RX ORDER — AMLODIPINE BESYLATE 10 MG/1
TABLET ORAL
Qty: 90 TABLET | Refills: 0 | Status: SHIPPED | OUTPATIENT
Start: 2021-09-10 | End: 2021-12-08

## 2021-11-29 RX ORDER — LOSARTAN POTASSIUM 100 MG/1
TABLET ORAL
Qty: 30 TABLET | Refills: 0 | Status: SHIPPED | OUTPATIENT
Start: 2021-11-29 | End: 2022-01-25

## 2021-11-29 RX ORDER — LOSARTAN POTASSIUM 100 MG/1
TABLET ORAL
Qty: 90 TABLET | OUTPATIENT
Start: 2021-11-29

## 2021-12-07 ENCOUNTER — OFFICE VISIT (OUTPATIENT)
Dept: FAMILY MEDICINE CLINIC | Age: 46
End: 2021-12-07

## 2021-12-07 VITALS
SYSTOLIC BLOOD PRESSURE: 137 MMHG | DIASTOLIC BLOOD PRESSURE: 84 MMHG | HEIGHT: 75 IN | TEMPERATURE: 98.4 F | WEIGHT: 277.6 LBS | HEART RATE: 92 BPM | BODY MASS INDEX: 34.52 KG/M2 | OXYGEN SATURATION: 97 %

## 2021-12-07 DIAGNOSIS — J40 BRONCHITIS: ICD-10-CM

## 2021-12-07 DIAGNOSIS — R05.9 COUGH: Primary | ICD-10-CM

## 2021-12-07 PROCEDURE — 96372 THER/PROPH/DIAG INJ SC/IM: CPT | Performed by: NURSE PRACTITIONER

## 2021-12-07 PROCEDURE — 99212 OFFICE O/P EST SF 10 MIN: CPT | Performed by: NURSE PRACTITIONER

## 2021-12-07 RX ORDER — ALBUTEROL SULFATE 90 UG/1
2 AEROSOL, METERED RESPIRATORY (INHALATION) EVERY 4 HOURS PRN
Qty: 6.7 G | Refills: 0 | Status: SHIPPED | OUTPATIENT
Start: 2021-12-07 | End: 2022-05-10

## 2021-12-07 RX ORDER — GUAIFENESIN DEXTROMETHORPHAN HYDROBROMIDE ORAL SOLUTION 10; 100 MG/5ML; MG/5ML
5 SOLUTION ORAL 3 TIMES DAILY PRN
Qty: 236 ML | Refills: 0 | Status: SHIPPED | OUTPATIENT
Start: 2021-12-07 | End: 2022-05-10

## 2021-12-07 RX ORDER — TRIAMCINOLONE ACETONIDE 40 MG/ML
60 INJECTION, SUSPENSION INTRA-ARTICULAR; INTRAMUSCULAR ONCE
Status: COMPLETED | OUTPATIENT
Start: 2021-12-07 | End: 2021-12-07

## 2021-12-07 RX ADMIN — TRIAMCINOLONE ACETONIDE 60 MG: 40 INJECTION, SUSPENSION INTRA-ARTICULAR; INTRAMUSCULAR at 16:30

## 2021-12-07 NOTE — PROGRESS NOTES
"Chief Complaint  Cough (for more than a week; currently taking augmentin)    Subjective    Patient is a 46 year old male in today with complaints of continued cough. Patient reports symptoms began one week ago. He went to immediate care and was placed on an antibiotic 3 days ago but is not improving.          Robbin Myers presents to [facility]  Cough  This is a new problem. The current episode started in the past 7 days. The problem has been gradually worsening. The cough is productive of sputum. Associated symptoms include chills, ear pain, a fever, nasal congestion, a sore throat and shortness of breath. Pertinent negatives include no chest pain. Nothing aggravates the symptoms. The treatment provided mild relief.       Objective   Vital Signs:   /84 (BP Location: Left arm, Patient Position: Sitting)   Pulse 92   Temp 98.4 °F (36.9 °C) (Oral)   Ht 190.5 cm (75\")   Wt 126 kg (277 lb 9.6 oz)   SpO2 97% Comment: on room air  BMI 34.70 kg/m²     Physical Exam  HENT:      Head: Normocephalic.      Right Ear: Tympanic membrane, ear canal and external ear normal.      Left Ear: Tympanic membrane, ear canal and external ear normal.      Nose: Nose normal.      Mouth/Throat:      Mouth: Mucous membranes are moist.      Pharynx: Oropharynx is clear. Posterior oropharyngeal erythema present. No oropharyngeal exudate.   Cardiovascular:      Rate and Rhythm: Normal rate and regular rhythm.      Pulses: Normal pulses.      Heart sounds: Normal heart sounds.   Pulmonary:      Effort: Pulmonary effort is normal.      Breath sounds: Normal breath sounds.   Skin:     General: Skin is warm and dry.   Neurological:      Mental Status: He is alert and oriented to person, place, and time.   Psychiatric:         Mood and Affect: Mood normal.        Result Review :                 Assessment and Plan    Diagnoses and all orders for this visit:    1. Cough (Primary)  -     triamcinolone " acetonide (KENALOG-40) injection 60 mg  -     albuterol sulfate  (90 Base) MCG/ACT inhaler; Inhale 2 puffs by mouth Every 4 (Four) Hours As Needed for Wheezing or Shortness of Air.  Dispense: 6.7 g; Refill: 0  -     dextromethorphan-guaifenesin (ROBITUSSIN-DM)  MG/5ML liquid; Take 5 mL by mouth 3 (Three) Times a Day As Needed.  Dispense: 236 mL; Refill: 0    2. Bronchitis  -     albuterol sulfate  (90 Base) MCG/ACT inhaler; Inhale 2 puffs by mouth Every 4 (Four) Hours As Needed for Wheezing or Shortness of Air.  Dispense: 6.7 g; Refill: 0  -     dextromethorphan-guaifenesin (ROBITUSSIN-DM)  MG/5ML liquid; Take 5 mL by mouth 3 (Three) Times a Day As Needed.  Dispense: 236 mL; Refill: 0        Follow Up   Return if symptoms worsen or fail to improve.  Patient was given instructions and counseling regarding his condition or for health maintenance advice. Please see specific information pulled into the AVS if appropriate.

## 2021-12-08 RX ORDER — AMLODIPINE BESYLATE 10 MG/1
TABLET ORAL
Qty: 90 TABLET | Refills: 0 | Status: SHIPPED | OUTPATIENT
Start: 2021-12-08 | End: 2022-03-21

## 2022-01-25 RX ORDER — LOSARTAN POTASSIUM 100 MG/1
TABLET ORAL
Qty: 30 TABLET | Refills: 0 | Status: SHIPPED | OUTPATIENT
Start: 2022-01-25 | End: 2022-02-24

## 2022-01-26 RX ORDER — LOSARTAN POTASSIUM 100 MG/1
TABLET ORAL
Qty: 90 TABLET | OUTPATIENT
Start: 2022-01-26

## 2022-02-24 RX ORDER — LOSARTAN POTASSIUM 100 MG/1
TABLET ORAL
Qty: 30 TABLET | Refills: 0 | Status: SHIPPED | OUTPATIENT
Start: 2022-02-24 | End: 2022-02-28

## 2022-02-28 RX ORDER — LOSARTAN POTASSIUM 100 MG/1
TABLET ORAL
Qty: 30 TABLET | Refills: 0 | Status: SHIPPED | OUTPATIENT
Start: 2022-02-28 | End: 2022-04-28

## 2022-03-02 RX ORDER — LOSARTAN POTASSIUM 100 MG/1
TABLET ORAL
Qty: 90 TABLET | OUTPATIENT
Start: 2022-03-02

## 2022-03-21 RX ORDER — AMLODIPINE BESYLATE 10 MG/1
TABLET ORAL
Qty: 90 TABLET | Refills: 0 | Status: SHIPPED | OUTPATIENT
Start: 2022-03-21 | End: 2022-08-12 | Stop reason: SDUPTHER

## 2022-04-28 RX ORDER — LOSARTAN POTASSIUM 100 MG/1
TABLET ORAL
Qty: 30 TABLET | Refills: 0 | Status: SHIPPED | OUTPATIENT
Start: 2022-04-28 | End: 2022-04-28

## 2022-04-28 RX ORDER — LOSARTAN POTASSIUM 100 MG/1
100 TABLET ORAL DAILY
Qty: 90 TABLET | Refills: 0 | Status: SHIPPED | OUTPATIENT
Start: 2022-04-28 | End: 2022-08-25 | Stop reason: SDUPTHER

## 2022-05-10 ENCOUNTER — OFFICE VISIT (OUTPATIENT)
Dept: FAMILY MEDICINE CLINIC | Age: 47
End: 2022-05-10

## 2022-05-10 VITALS
HEART RATE: 73 BPM | SYSTOLIC BLOOD PRESSURE: 120 MMHG | WEIGHT: 264 LBS | BODY MASS INDEX: 32.83 KG/M2 | DIASTOLIC BLOOD PRESSURE: 84 MMHG | TEMPERATURE: 98.4 F | HEIGHT: 75 IN

## 2022-05-10 DIAGNOSIS — G89.29 CHRONIC LEFT SHOULDER PAIN: ICD-10-CM

## 2022-05-10 DIAGNOSIS — E55.9 VITAMIN D DEFICIENCY: ICD-10-CM

## 2022-05-10 DIAGNOSIS — G47.30 SLEEP APNEA, UNSPECIFIED TYPE: ICD-10-CM

## 2022-05-10 DIAGNOSIS — Z13.220 SCREENING FOR LIPID DISORDERS: ICD-10-CM

## 2022-05-10 DIAGNOSIS — M75.02 ADHESIVE CAPSULITIS OF LEFT SHOULDER: ICD-10-CM

## 2022-05-10 DIAGNOSIS — I20.8 ANGINA OF EFFORT: ICD-10-CM

## 2022-05-10 DIAGNOSIS — E66.9 OBESITY (BMI 30-39.9): ICD-10-CM

## 2022-05-10 DIAGNOSIS — R53.83 FATIGUE, UNSPECIFIED TYPE: ICD-10-CM

## 2022-05-10 DIAGNOSIS — F33.1 MODERATE EPISODE OF RECURRENT MAJOR DEPRESSIVE DISORDER: ICD-10-CM

## 2022-05-10 DIAGNOSIS — I10 PRIMARY HYPERTENSION: Primary | ICD-10-CM

## 2022-05-10 DIAGNOSIS — M25.512 CHRONIC LEFT SHOULDER PAIN: ICD-10-CM

## 2022-05-10 DIAGNOSIS — M65.4 TENOSYNOVITIS, DE QUERVAIN: ICD-10-CM

## 2022-05-10 DIAGNOSIS — Z13.1 SCREENING FOR DIABETES MELLITUS: ICD-10-CM

## 2022-05-10 DIAGNOSIS — Z00.00 ANNUAL PHYSICAL EXAM: ICD-10-CM

## 2022-05-10 PROBLEM — I20.89 ANGINA OF EFFORT: Status: ACTIVE | Noted: 2022-05-10

## 2022-05-10 PROCEDURE — 99214 OFFICE O/P EST MOD 30 MIN: CPT | Performed by: NURSE PRACTITIONER

## 2022-05-10 PROCEDURE — 93000 ELECTROCARDIOGRAM COMPLETE: CPT | Performed by: FAMILY MEDICINE

## 2022-05-10 RX ORDER — VORTIOXETINE 10 MG/1
1 TABLET, FILM COATED ORAL DAILY
COMMUNITY
Start: 2022-04-24 | End: 2022-05-25

## 2022-05-10 RX ORDER — METHYLPHENIDATE HYDROCHLORIDE 10 MG/1
TABLET ORAL
COMMUNITY
Start: 2022-03-14 | End: 2022-11-08

## 2022-05-10 RX ORDER — MULTIVIT WITH MINERALS/LUTEIN
250 TABLET ORAL DAILY
COMMUNITY
End: 2022-05-25

## 2022-05-10 RX ORDER — UBIDECARENONE 75 MG
50 CAPSULE ORAL DAILY
COMMUNITY
End: 2022-05-25

## 2022-05-10 NOTE — ASSESSMENT & PLAN NOTE
-suspect arthritis, but patient would like to proceed with MRI if xray normal since he has been dealing with this for 2 years

## 2022-05-10 NOTE — ASSESSMENT & PLAN NOTE
-discussed symptomatic treatment with pt  -NSAID therapy, wear wrist brace, avoid over use  -offered steroid boost, but pt does not like SE of steroids   -discussed possible future use of steroid injection into tendon sheath  -pt will report if loss of movement occurs in extremtiy, severe pain, or continuous numbness occurs

## 2022-05-10 NOTE — ASSESSMENT & PLAN NOTE
-referral to cardio  -discussed starting baby ASA daily   -ER precautions given  -EKG unrevealing in office   -suspect this may be r/t underlying anxiety

## 2022-05-10 NOTE — ASSESSMENT & PLAN NOTE
-Patient was counseled to eat low fat, low carb diet limiting fried food/sweets and increase fruits/vegetables/whole grains.   -light walking okay, no vigorous exercise for now

## 2022-05-10 NOTE — ASSESSMENT & PLAN NOTE
-the pt was advised to follow up with his psychiatrist for his depression  -based on his symptoms and PHQ9 score of 17, this is not currently managed well  -The patient denies thoughts of suicide, self-harm, or homicide.

## 2022-05-10 NOTE — ASSESSMENT & PLAN NOTE
-BP is well controlled  -continue losartan 100 mg and amlodipine 10 mg qday  -the patient wants to come off of these meds  -told him for now I do not think it would be appropriate especially if he has an underlying cardiac issue  -if cardiac workup neg, discussed that If he loses a significant amount of wt, stopping these meds may be possible in the future

## 2022-05-10 NOTE — PROGRESS NOTES
Procedure     ECG 12 Lead    Date/Time: 5/10/2022 3:46 PM  Performed by: Liset Arnett MD  Authorized by: Haydee Leigh APRN   Comparison: not compared with previous ECG   Rhythm: sinus rhythm  Rate: normal  Conduction: conduction normal  ST Segments: ST segments normal  T Waves: T waves normal  QRS axis: normal  Other: no other findings    Clinical impression: normal ECG  Comments: Normal EKG.  [facility]

## 2022-05-10 NOTE — PROGRESS NOTES
"Robbin Myers presents to [facility] with complaint of  Hypertension and Establish Care    SUBJECTIVE  History of Present Illness    He is here to re-establish primary care. He is , he works as a farmer. He was seeing Dr. Ford but has not seen him in over a year. The patient opens the visit by saying \"I need help, I feel like poop.\"    He has chronic medical conditions of HTN, depression, ADD. He has been on Ritalin for the past year. He says it helps in the AM, but feels it wears off. His psychiatrist told him he may take bid, but the patient only take qday. He is on Trintellix or depression but feels it is not helping.     He checks his BP at home on occasion and usually is around 130/90.     He also has left shoulder pain. This pain has been present for 2 years. Pain does not radiate. He does say he has numbness in his left hand and fingers that is worse when he wakes up in the AM, and goes away by late morning. He does say that he has left wrist pain as well.     He has not had screening colonoscopy.      He is also having chest pains that occur at random. He is wanting to be evaluated by cardiology. He says the chest pains come and go, seem to occur when he is exerting himself. The pains occurs for a few minutes then resolve completely. He says the pain does sometimes radiate to his left arm. He denies any SOA. He says he is not sure if they occur when he has anxiety.     He also uses a CPAP or BIPAP for sleep apnea. He feels that his current machine is not working correctly. He would like a referral for this to be checked.     He has never smoked, he does drink 2-3 acholic beverages per week, denies illicit drug use.     OBJECTIVE  Vital Signs:   /84 (BP Location: Right arm, Patient Position: Sitting)   Pulse 73   Temp 98.4 °F (36.9 °C) (Oral)   Ht 190.5 cm (75\")   Wt 120 kg (264 lb)   BMI 33.00 kg/m²       Physical Exam  Constitutional:       General: " He is not in acute distress.     Appearance: Normal appearance. He is obese. He is not ill-appearing.   HENT:      Head: Normocephalic and atraumatic.      Nose: Nose normal.      Mouth/Throat:      Mouth: Mucous membranes are moist.   Cardiovascular:      Rate and Rhythm: Normal rate and regular rhythm.      Pulses: Normal pulses.      Heart sounds: Normal heart sounds.   Pulmonary:      Effort: Pulmonary effort is normal. No respiratory distress.      Breath sounds: Normal breath sounds.   Chest:      Chest wall: No tenderness.   Musculoskeletal:         General: Normal range of motion.      Right shoulder: Normal.      Left shoulder: Normal. No tenderness, bony tenderness or crepitus. Normal range of motion (pain expressed with overhead adduction ).      Right wrist: Normal.      Left wrist: No swelling or crepitus. Normal range of motion.      Cervical back: Normal range of motion and neck supple.      Comments: Positive Finkelstein test left wrist. Negative phalen    Skin:     General: Skin is warm and dry.      Capillary Refill: Capillary refill takes less than 2 seconds.   Neurological:      General: No focal deficit present.      Mental Status: He is alert and oriented to person, place, and time. Mental status is at baseline.   Psychiatric:         Mood and Affect: Mood normal.         Behavior: Behavior normal.          Results Review:  The following data was reviewed by STEPHANE Smith [unfilled] 08:24 EDT.    CBC (No Diff) (08/06/2021 15:40)  Vitamin D 25 Hydroxy (08/06/2021 15:40)  Comprehensive Metabolic Panel (08/06/2021 15:40)      PHQ-9 Depression Screening  Little interest or pleasure in doing things? 2-->more than half the days   Feeling down, depressed, or hopeless? 2-->more than half the days   Trouble falling or staying asleep, or sleeping too much? 3-->nearly every day   Feeling tired or having little energy? 3-->nearly every day   Poor appetite or overeating? 3-->nearly every day   Feeling bad  about yourself - or that you are a failure or have let yourself or your family down? 1-->several days   Trouble concentrating on things, such as reading the newspaper or watching television? 3-->nearly every day   Moving or speaking so slowly that other people could have noticed? Or the opposite - being so fidgety or restless that you have been moving around a lot more than usual? 0-->not at all   Thoughts that you would be better off dead, or of hurting yourself in some way? 0-->not at all   PHQ-9 Total Score 17   If you checked off any problems, how difficult have these problems made it for you to do your work, take care of things at home, or get along with other people? somewhat difficult           ASSESSMENT AND PLAN:  Diagnoses and all orders for this visit:    1. Primary hypertension (Primary)  Assessment & Plan:  -BP is well controlled  -continue losartan 100 mg and amlodipine 10 mg qday  -the patient wants to come off of these meds  -told him for now I do not think it would be appropriate especially if he has an underlying cardiac issue  -if cardiac workup neg, discussed that If he loses a significant amount of wt, stopping these meds may be possible in the future     Orders:  -     Comprehensive Metabolic Panel; Future  -     CBC & Differential; Future    2. Annual physical exam    3. Moderate episode of recurrent major depressive disorder (HCC)  Assessment & Plan:  -the pt was advised to follow up with his psychiatrist for his depression  -based on his symptoms and PHQ9 score of 17, this is not currently managed well  -The patient denies thoughts of suicide, self-harm, or homicide.         4. Chronic left shoulder pain  Assessment & Plan:  -suspect arthritis, but patient would like to proceed with MRI if xray normal since he has been dealing with this for 2 years       Orders:  -     XR Shoulder 2+ View Left; Future  -     MRI Shoulder Left Without Contrast; Future    5. Fatigue, unspecified type  -      TSH; Future  -     Vitamin D 25 Hydroxy; Future  -     Vitamin B12 & Folate; Future  -     Magnesium; Future  -     Cancel: Testosterone; Future  -     Testosterone, Free, Total; Future    6. Screening for lipid disorders  -     Lipid Panel; Future    7. Screening for diabetes mellitus  -     Hemoglobin A1c; Future    8. Sleep apnea, unspecified type  -     Ambulatory Referral to Sleep Medicine    9. Angina of effort (HCC)  Assessment & Plan:  -referral to cardio  -discussed starting baby ASA daily   -ER precautions given  -EKG unrevealing in office   -suspect this may be r/t underlying anxiety       Orders:  -     Ambulatory Referral to Cardiology  -     ECG 12 Lead    10. Obesity (BMI 30-39.9)  Assessment & Plan:  -Patient was counseled to eat low fat, low carb diet limiting fried food/sweets and increase fruits/vegetables/whole grains.   -light walking okay, no vigorous exercise for now         11. Tenosynovitis, de Quervain  Assessment & Plan:  -discussed symptomatic treatment with pt  -NSAID therapy, wear wrist brace, avoid over use  -offered steroid boost, but pt does not like SE of steroids   -discussed possible future use of steroid injection into tendon sheath  -pt will report if loss of movement occurs in extremtiy, severe pain, or continuous numbness occurs       12. Vitamin D deficiency  -     vitamin D (ERGOCALCIFEROL) 1.25 MG (76876 UT) capsule capsule; Take 1 capsule by mouth Every 7 (Seven) Days.  Dispense: 8 capsule; Refill: 0  -     Vitamin D 25 Hydroxy; Future      Follow Up   Return in about 6 months (around 11/10/2022). Patient to notify office with any acute concerns or issues.  Patient verbalizes understanding, agrees with plan of care and has no further questions upon discharge.     Patient was given instructions and counseling regarding his condition or for health maintenance advice. Please see specific information pulled into the AVS if appropriate.

## 2022-05-11 ENCOUNTER — HOSPITAL ENCOUNTER (OUTPATIENT)
Dept: GENERAL RADIOLOGY | Facility: HOSPITAL | Age: 47
Discharge: HOME | End: 2022-05-11

## 2022-05-11 ENCOUNTER — LAB (OUTPATIENT)
Dept: LAB | Facility: HOSPITAL | Age: 47
End: 2022-05-11

## 2022-05-11 DIAGNOSIS — M25.512 CHRONIC LEFT SHOULDER PAIN: ICD-10-CM

## 2022-05-11 DIAGNOSIS — Z13.1 SCREENING FOR DIABETES MELLITUS: ICD-10-CM

## 2022-05-11 DIAGNOSIS — R53.83 FATIGUE, UNSPECIFIED TYPE: ICD-10-CM

## 2022-05-11 DIAGNOSIS — I10 PRIMARY HYPERTENSION: ICD-10-CM

## 2022-05-11 DIAGNOSIS — G89.29 CHRONIC LEFT SHOULDER PAIN: ICD-10-CM

## 2022-05-11 DIAGNOSIS — Z13.220 SCREENING FOR LIPID DISORDERS: ICD-10-CM

## 2022-05-11 LAB
25(OH)D3 SERPL-MCNC: 25.4 NG/ML (ref 30–100)
ALBUMIN SERPL-MCNC: 4.6 G/DL (ref 3.5–5.2)
ALBUMIN/GLOB SERPL: 1.8 G/DL
ALP SERPL-CCNC: 58 U/L (ref 39–117)
ALT SERPL W P-5'-P-CCNC: 38 U/L (ref 1–41)
ANION GAP SERPL CALCULATED.3IONS-SCNC: 12.7 MMOL/L (ref 5–15)
AST SERPL-CCNC: 24 U/L (ref 1–40)
BASOPHILS # BLD AUTO: 0.01 10*3/MM3 (ref 0–0.2)
BASOPHILS NFR BLD AUTO: 0.2 % (ref 0–1.5)
BILIRUB SERPL-MCNC: 0.3 MG/DL (ref 0–1.2)
BUN SERPL-MCNC: 10 MG/DL (ref 6–20)
BUN/CREAT SERPL: 10.1 (ref 7–25)
CALCIUM SPEC-SCNC: 9.4 MG/DL (ref 8.6–10.5)
CHLORIDE SERPL-SCNC: 105 MMOL/L (ref 98–107)
CHOLEST SERPL-MCNC: 135 MG/DL (ref 0–200)
CO2 SERPL-SCNC: 21.3 MMOL/L (ref 22–29)
CREAT SERPL-MCNC: 0.99 MG/DL (ref 0.76–1.27)
DEPRECATED RDW RBC AUTO: 41.3 FL (ref 37–54)
EGFRCR SERPLBLD CKD-EPI 2021: 94.6 ML/MIN/1.73
EOSINOPHIL # BLD AUTO: 0.05 10*3/MM3 (ref 0–0.4)
EOSINOPHIL NFR BLD AUTO: 0.9 % (ref 0.3–6.2)
ERYTHROCYTE [DISTWIDTH] IN BLOOD BY AUTOMATED COUNT: 13 % (ref 12.3–15.4)
FOLATE SERPL-MCNC: 11.2 NG/ML (ref 4.78–24.2)
GLOBULIN UR ELPH-MCNC: 2.5 GM/DL
GLUCOSE SERPL-MCNC: 103 MG/DL (ref 65–99)
HBA1C MFR BLD: 5.4 % (ref 4.8–5.6)
HCT VFR BLD AUTO: 45.3 % (ref 37.5–51)
HDLC SERPL-MCNC: 38 MG/DL (ref 40–60)
HGB BLD-MCNC: 14.8 G/DL (ref 13–17.7)
IMM GRANULOCYTES # BLD AUTO: 0.01 10*3/MM3 (ref 0–0.05)
IMM GRANULOCYTES NFR BLD AUTO: 0.2 % (ref 0–0.5)
LDLC SERPL CALC-MCNC: 84 MG/DL (ref 0–100)
LDLC/HDLC SERPL: 2.22 {RATIO}
LYMPHOCYTES # BLD AUTO: 2.11 10*3/MM3 (ref 0.7–3.1)
LYMPHOCYTES NFR BLD AUTO: 36.2 % (ref 19.6–45.3)
MAGNESIUM SERPL-MCNC: 2.1 MG/DL (ref 1.6–2.6)
MCH RBC QN AUTO: 28.2 PG (ref 26.6–33)
MCHC RBC AUTO-ENTMCNC: 32.7 G/DL (ref 31.5–35.7)
MCV RBC AUTO: 86.3 FL (ref 79–97)
MONOCYTES # BLD AUTO: 0.37 10*3/MM3 (ref 0.1–0.9)
MONOCYTES NFR BLD AUTO: 6.3 % (ref 5–12)
NEUTROPHILS NFR BLD AUTO: 3.28 10*3/MM3 (ref 1.7–7)
NEUTROPHILS NFR BLD AUTO: 56.2 % (ref 42.7–76)
PLATELET # BLD AUTO: 257 10*3/MM3 (ref 140–450)
PMV BLD AUTO: 10 FL (ref 6–12)
POTASSIUM SERPL-SCNC: 4 MMOL/L (ref 3.5–5.2)
PROT SERPL-MCNC: 7.1 G/DL (ref 6–8.5)
RBC # BLD AUTO: 5.25 10*6/MM3 (ref 4.14–5.8)
SODIUM SERPL-SCNC: 139 MMOL/L (ref 136–145)
TRIGL SERPL-MCNC: 64 MG/DL (ref 0–150)
TSH SERPL DL<=0.05 MIU/L-ACNC: 1.76 UIU/ML (ref 0.27–4.2)
VIT B12 BLD-MCNC: 524 PG/ML (ref 211–946)
VLDLC SERPL-MCNC: 13 MG/DL (ref 5–40)
WBC NRBC COR # BLD: 5.83 10*3/MM3 (ref 3.4–10.8)

## 2022-05-11 PROCEDURE — 83036 HEMOGLOBIN GLYCOSYLATED A1C: CPT

## 2022-05-11 PROCEDURE — 85025 COMPLETE CBC W/AUTO DIFF WBC: CPT

## 2022-05-11 PROCEDURE — 84403 ASSAY OF TOTAL TESTOSTERONE: CPT

## 2022-05-11 PROCEDURE — 83735 ASSAY OF MAGNESIUM: CPT

## 2022-05-11 PROCEDURE — 84443 ASSAY THYROID STIM HORMONE: CPT

## 2022-05-11 PROCEDURE — 80053 COMPREHEN METABOLIC PANEL: CPT

## 2022-05-11 PROCEDURE — 36415 COLL VENOUS BLD VENIPUNCTURE: CPT

## 2022-05-11 PROCEDURE — 82607 VITAMIN B-12: CPT

## 2022-05-11 PROCEDURE — 82746 ASSAY OF FOLIC ACID SERUM: CPT

## 2022-05-11 PROCEDURE — 80061 LIPID PANEL: CPT

## 2022-05-11 PROCEDURE — 73030 X-RAY EXAM OF SHOULDER: CPT

## 2022-05-11 PROCEDURE — 82306 VITAMIN D 25 HYDROXY: CPT

## 2022-05-11 PROCEDURE — 84402 ASSAY OF FREE TESTOSTERONE: CPT

## 2022-05-14 LAB
TESTOST FREE SERPL-MCNC: 5.7 PG/ML (ref 6.8–21.5)
TESTOST SERPL-MCNC: 324 NG/DL (ref 264–916)

## 2022-05-14 RX ORDER — ERGOCALCIFEROL 1.25 MG/1
50000 CAPSULE ORAL
Qty: 8 CAPSULE | Refills: 0 | Status: SHIPPED | OUTPATIENT
Start: 2022-05-14 | End: 2022-08-26

## 2022-05-16 ENCOUNTER — OFFICE VISIT (OUTPATIENT)
Dept: FAMILY MEDICINE CLINIC | Age: 47
End: 2022-05-16

## 2022-05-16 VITALS
SYSTOLIC BLOOD PRESSURE: 114 MMHG | HEIGHT: 75 IN | BODY MASS INDEX: 32.65 KG/M2 | HEART RATE: 79 BPM | WEIGHT: 262.6 LBS | DIASTOLIC BLOOD PRESSURE: 70 MMHG | TEMPERATURE: 98.9 F

## 2022-05-16 DIAGNOSIS — H65.02 NON-RECURRENT ACUTE SEROUS OTITIS MEDIA OF LEFT EAR: ICD-10-CM

## 2022-05-16 DIAGNOSIS — J06.9 UPPER RESPIRATORY TRACT INFECTION, UNSPECIFIED TYPE: Primary | ICD-10-CM

## 2022-05-16 DIAGNOSIS — E55.9 VITAMIN D DEFICIENCY: ICD-10-CM

## 2022-05-16 LAB
EXPIRATION DATE: NORMAL
FLUAV AG UPPER RESP QL IA.RAPID: NOT DETECTED
FLUBV AG UPPER RESP QL IA.RAPID: NOT DETECTED
INTERNAL CONTROL: NORMAL
Lab: NORMAL
SARS-COV-2 AG UPPER RESP QL IA.RAPID: NOT DETECTED

## 2022-05-16 PROCEDURE — 87428 SARSCOV & INF VIR A&B AG IA: CPT | Performed by: NURSE PRACTITIONER

## 2022-05-16 PROCEDURE — 96372 THER/PROPH/DIAG INJ SC/IM: CPT | Performed by: NURSE PRACTITIONER

## 2022-05-16 PROCEDURE — 99213 OFFICE O/P EST LOW 20 MIN: CPT | Performed by: NURSE PRACTITIONER

## 2022-05-16 RX ORDER — BENZONATATE 100 MG/1
100 CAPSULE ORAL 3 TIMES DAILY PRN
Qty: 30 CAPSULE | Refills: 0 | Status: SHIPPED | OUTPATIENT
Start: 2022-05-16 | End: 2022-05-25

## 2022-05-16 RX ORDER — TRIAMCINOLONE ACETONIDE 40 MG/ML
60 INJECTION, SUSPENSION INTRA-ARTICULAR; INTRAMUSCULAR ONCE
Status: COMPLETED | OUTPATIENT
Start: 2022-05-16 | End: 2022-05-16

## 2022-05-16 RX ORDER — PREDNISONE 20 MG/1
TABLET ORAL
Qty: 19 TABLET | Refills: 0 | Status: SHIPPED | OUTPATIENT
Start: 2022-05-16 | End: 2022-05-16

## 2022-05-16 RX ORDER — AMOXICILLIN 500 MG/1
1000 CAPSULE ORAL 3 TIMES DAILY
Qty: 30 CAPSULE | Refills: 0 | Status: SHIPPED | OUTPATIENT
Start: 2022-05-16 | End: 2022-05-21

## 2022-05-16 RX ADMIN — TRIAMCINOLONE ACETONIDE 60 MG: 40 INJECTION, SUSPENSION INTRA-ARTICULAR; INTRAMUSCULAR at 10:12

## 2022-05-16 NOTE — TELEPHONE ENCOUNTER
Rx Refill Note  Requested Prescriptions     Pending Prescriptions Disp Refills   • vitamin D (ERGOCALCIFEROL) 1.25 MG (94084 UT) capsule capsule [Pharmacy Med Name: VITAMIN D2 50,000IU (ERGO) CAP RX] 12 capsule      Sig: TAKE 1 CAPSULE BY MOUTH EVERY 7 DAYS      Last office visit with prescribing clinician: 5/16/2022      Next office visit with prescribing clinician: 11/10/2022    Manuela Galeano RN  05/16/22, 11:42 EDT

## 2022-05-16 NOTE — ASSESSMENT & PLAN NOTE
-The patient was encouraged to increase rest and fluids. May take Tylenol for pain or fever. If symptoms persist 7 days or longer, come back in to be seen.

## 2022-05-16 NOTE — PROGRESS NOTES
"Robbin Myers presents to [facility] with complaint of  Nasal Congestion (States he had fever/ chill yesterday, fatigued )    SUBJECTIVE  URI   This is a new problem. Episode onset: 3 days ago. The problem has been unchanged. The maximum temperature recorded prior to his arrival was 100.4 - 100.9 F. The fever has been present for less than 1 day. Associated symptoms include congestion, coughing, ear pain, headaches, rhinorrhea, sneezing, a sore throat and swollen glands. Pertinent negatives include no chest pain, diarrhea, vomiting or wheezing. He has tried antihistamine (Robitussin ) for the symptoms. The treatment provided no relief.   He says his wife and daughter have recently been sick as well.       OBJECTIVE  Vital Signs:   /70 (BP Location: Left arm, Patient Position: Sitting)   Pulse 79   Temp 98.9 °F (37.2 °C) (Oral)   Ht 190.5 cm (75\")   Wt 119 kg (262 lb 9.6 oz)   BMI 32.82 kg/m²       Physical Exam  Constitutional:       General: He is not in acute distress.     Appearance: Normal appearance. He is not ill-appearing.   HENT:      Head: Normocephalic and atraumatic.      Right Ear: Ear canal normal. A middle ear effusion is present.      Left Ear: Ear canal normal. Tenderness present. A middle ear effusion is present. Tympanic membrane is erythematous and bulging.      Ears:      Comments: Left side effusion infectious appearing      Nose: Nose normal.      Mouth/Throat:      Mouth: Mucous membranes are moist.   Cardiovascular:      Rate and Rhythm: Normal rate and regular rhythm.      Pulses: Normal pulses.      Heart sounds: Normal heart sounds.   Pulmonary:      Effort: Pulmonary effort is normal. No respiratory distress.      Breath sounds: Normal breath sounds.   Chest:      Chest wall: No tenderness.   Musculoskeletal:         General: Normal range of motion.      Cervical back: Normal range of motion and neck supple.   Lymphadenopathy:      Cervical: " Cervical adenopathy present.      Right cervical: No superficial cervical adenopathy.     Left cervical: Superficial cervical adenopathy present.   Skin:     General: Skin is warm and dry.      Capillary Refill: Capillary refill takes less than 2 seconds.   Neurological:      General: No focal deficit present.      Mental Status: He is alert and oriented to person, place, and time. Mental status is at baseline.   Psychiatric:         Mood and Affect: Mood normal.         Behavior: Behavior normal.          Results Review:  The following data was reviewed by Haydee Leigh, STEPHANE [unfilled] 08:52 EDT.    Office Visit on 05/16/2022   Component Date Value Ref Range Status   • SARS Antigen 05/16/2022 Not Detected  Not Detected, Presumptive Negative Final   • Influenza A Antigen TORY 05/16/2022 Not Detected  Not Detected Final   • Influenza B Antigen TORY 05/16/2022 Not Detected  Not Detected Final   • Internal Control 05/16/2022 Passed  Passed Final   • Lot Number 05/16/2022 [id number]   Final   • Expiration Date 05/16/2022 12/08/2023   Final       ASSESSMENT AND PLAN:  Diagnoses and all orders for this visit:    1. Upper respiratory tract infection, unspecified type (Primary)  Assessment & Plan:  -The patient was encouraged to increase rest and fluids. May take Tylenol for pain or fever. If symptoms persist 7 days or longer, come back in to be seen.       Orders:  -     POCT SARS-CoV-2 Antigen TORY + Flu  -     benzonatate (Tessalon Perles) 100 MG capsule; Take 1 capsule by mouth 3 (Three) Times a Day As Needed for Cough.  Dispense: 30 capsule; Refill: 0  -     triamcinolone acetonide (KENALOG-40) injection 60 mg    2. Non-recurrent acute serous otitis media of left ear  Comments:  -may use warm compress for comfort as well  Orders:  -     amoxicillin (AMOXIL) 500 MG capsule; Take 2 capsules by mouth 3 (Three) Times a Day for 5 days.  Dispense: 30 capsule; Refill: 0  -     triamcinolone acetonide (KENALOG-40) injection 60  mg    Other orders  -     Discontinue: predniSONE (DELTASONE) 20 MG tablet; Take 3 tablets by mouth Daily for 3 days, THEN 2 tablets Daily for 3 days, THEN 1 tablet Daily for 3 days, THEN 0.5 tablets Daily for 1 day.  Dispense: 19 tablet; Refill: 0      Follow Up   Return if symptoms worsen or fail to improve. Patient to notify office with any acute concerns or issues.  Patient verbalizes understanding, agrees with plan of care and has no further questions upon discharge.     Patient was given instructions and counseling regarding his condition or for health maintenance advice. Please see specific information pulled into the AVS if appropriate.

## 2022-05-17 RX ORDER — ERGOCALCIFEROL 1.25 MG/1
CAPSULE ORAL
Qty: 12 CAPSULE | OUTPATIENT
Start: 2022-05-17

## 2022-05-25 ENCOUNTER — LAB (OUTPATIENT)
Dept: LAB | Facility: HOSPITAL | Age: 47
End: 2022-05-25

## 2022-05-25 ENCOUNTER — OFFICE VISIT (OUTPATIENT)
Dept: FAMILY MEDICINE CLINIC | Age: 47
End: 2022-05-25

## 2022-05-25 VITALS
SYSTOLIC BLOOD PRESSURE: 125 MMHG | DIASTOLIC BLOOD PRESSURE: 81 MMHG | OXYGEN SATURATION: 96 % | HEIGHT: 75 IN | BODY MASS INDEX: 32.53 KG/M2 | WEIGHT: 261.6 LBS | HEART RATE: 79 BPM | TEMPERATURE: 98.1 F

## 2022-05-25 DIAGNOSIS — R53.83 FATIGUE, UNSPECIFIED TYPE: ICD-10-CM

## 2022-05-25 DIAGNOSIS — R43.2 LOSS OF TASTE: ICD-10-CM

## 2022-05-25 DIAGNOSIS — R43.0 LOSS OF SMELL: ICD-10-CM

## 2022-05-25 DIAGNOSIS — J01.90 ACUTE NON-RECURRENT SINUSITIS, UNSPECIFIED LOCATION: Primary | ICD-10-CM

## 2022-05-25 DIAGNOSIS — R09.81 NASAL CONGESTION: ICD-10-CM

## 2022-05-25 LAB
EXPIRATION DATE: NORMAL
EXPIRATION DATE: NORMAL
FLUAV AG NPH QL: NEGATIVE
FLUBV AG NPH QL: NEGATIVE
INTERNAL CONTROL: NORMAL
INTERNAL CONTROL: NORMAL
Lab: NORMAL
Lab: NORMAL
SARS-COV-2 AG UPPER RESP QL IA.RAPID: NOT DETECTED

## 2022-05-25 PROCEDURE — 86665 EPSTEIN-BARR CAPSID VCA: CPT

## 2022-05-25 PROCEDURE — 87426 SARSCOV CORONAVIRUS AG IA: CPT | Performed by: PHYSICIAN ASSISTANT

## 2022-05-25 PROCEDURE — 86664 EPSTEIN-BARR NUCLEAR ANTIGEN: CPT

## 2022-05-25 PROCEDURE — 36415 COLL VENOUS BLD VENIPUNCTURE: CPT

## 2022-05-25 PROCEDURE — U0004 COV-19 TEST NON-CDC HGH THRU: HCPCS | Performed by: PHYSICIAN ASSISTANT

## 2022-05-25 PROCEDURE — 99214 OFFICE O/P EST MOD 30 MIN: CPT | Performed by: PHYSICIAN ASSISTANT

## 2022-05-25 PROCEDURE — 87804 INFLUENZA ASSAY W/OPTIC: CPT | Performed by: PHYSICIAN ASSISTANT

## 2022-05-25 RX ORDER — DOXYCYCLINE HYCLATE 100 MG/1
100 CAPSULE ORAL 2 TIMES DAILY
Qty: 20 CAPSULE | Refills: 0 | Status: SHIPPED | OUTPATIENT
Start: 2022-05-25 | End: 2022-06-04

## 2022-05-25 RX ORDER — FLUTICASONE PROPIONATE 50 MCG
2 SPRAY, SUSPENSION (ML) NASAL DAILY
Qty: 18.2 ML | Refills: 0 | Status: SHIPPED | OUTPATIENT
Start: 2022-05-25 | End: 2022-05-26

## 2022-05-25 NOTE — PATIENT INSTRUCTIONS
Your rapid COVID and flu test were negative again today. I have ordered  PCR COVID test to confirm the in-office test - Despite the negative test I am suspicious for COVID given the loss of taste and smell you are experiencing. I have also ordered testing for mono. This is a blood draw and we will contact you with the results in a few days.     For now, I have sent another antibiotic to the pharmacy for you. It is a 10 day course so hopefully will knock this infection out for good! If your PCR test is positive, you should stop the new antibiotic because antibiotics do not treat COVID. If your symptoms are worsening please return to the clinic for further evaluation.

## 2022-05-25 NOTE — PROGRESS NOTES
Subjective     CHIEF COMPLAINT    Chief Complaint   Patient presents with   • Nasal Congestion     Ongoing for about 2 weeks.    • Fatigue     Pt said he has lost his taste and smell.             This is a 46 year old male presenting to the clinic complaining of nasal congestion for the last 16 days. He has been very fatigued during this time as well and states he can't smell or taste anything. He specifically mentioned biting an onion on his sandwich today and not tasting it. He was seen about 9 days ago in this office for these symptoms. At that time he was diagnosed with AOM and treated with amoxicillin 1000mg TID x 5 days and he received an injection of triamcinalone. He does report improvement in his ear pain after that.                 Review of Systems   Constitutional: Positive for fatigue. Negative for chills and fever.   HENT: Positive for congestion, sinus pressure and sore throat (resolved). Negative for rhinorrhea.    Respiratory: Negative for cough, shortness of breath and wheezing.    Cardiovascular: Negative for chest pain.   Gastrointestinal: Negative for abdominal pain, diarrhea, nausea and vomiting.   Musculoskeletal: Negative for myalgias.   Skin: Negative for rash.   Neurological: Negative for headaches.            Past Medical History:   Diagnosis Date   • Anxiety    • Depression    • Hypertension             History reviewed. No pertinent surgical history.         Family History   Problem Relation Age of Onset   • Leukemia Mother             Social History     Socioeconomic History   • Marital status:    Tobacco Use   • Smoking status: Never Smoker   • Smokeless tobacco: Never Used   Substance and Sexual Activity   • Alcohol use: Yes     Comment: 1x weekly, 2-3 beers or bourbon drinks   • Drug use: Defer   • Sexual activity: Yes     Partners: Female            No Known Allergies         Current Outpatient Medications on File Prior to Visit   Medication Sig Dispense Refill   • ALPRAZolam  "XR (XANAX XR) 0.5 MG 24 hr tablet Take 1-2 tablets by mouth Daily As Needed.     • amLODIPine (NORVASC) 10 MG tablet TAKE 1 TABLET(10 MG) BY MOUTH EVERY DAY 90 tablet 0   • losartan (COZAAR) 100 MG tablet Take 1 tablet by mouth Daily. 90 tablet 0   • methylphenidate (RITALIN) 10 MG tablet TAKE 1 OR 2 TABLETS BY MOUTH THREE TIMES DAILY AS DIRECTED     • vitamin D (ERGOCALCIFEROL) 1.25 MG (91259 UT) capsule capsule Take 1 capsule by mouth Every 7 (Seven) Days. 8 capsule 0   • [DISCONTINUED] Trintellix 10 MG tablet Take 1 tablet by mouth Daily.     • [DISCONTINUED] vitamin B-12 (CYANOCOBALAMIN) 100 MCG tablet Take 50 mcg by mouth Daily.     • Cholecalciferol 50 MCG (2000 UT) tablet Vitamin D3 50 mcg (2,000 unit) tablet   Take 1 tablet every day by oral route.     • [DISCONTINUED] benzonatate (Tessalon Perles) 100 MG capsule Take 1 capsule by mouth 3 (Three) Times a Day As Needed for Cough. 30 capsule 0   • [DISCONTINUED] vitamin C (ASCORBIC ACID) 250 MG tablet Take 250 mg by mouth Daily.       No current facility-administered medications on file prior to visit.            /81 (BP Location: Right arm, Patient Position: Sitting, Cuff Size: Adult)   Pulse 79   Temp 98.1 °F (36.7 °C) (Oral)   Ht 190.5 cm (75\")   Wt 119 kg (261 lb 9.6 oz)   SpO2 96% Comment: room air  BMI 32.70 kg/m²          Objective     Physical Exam  Vitals and nursing note reviewed.   Constitutional:       General: He is not in acute distress.     Appearance: Normal appearance.   HENT:      Head: Normocephalic and atraumatic.      Right Ear: Tympanic membrane, ear canal and external ear normal.      Left Ear: Tympanic membrane, ear canal and external ear normal.      Nose: Congestion present. No rhinorrhea.      Right Sinus: No maxillary sinus tenderness or frontal sinus tenderness.      Left Sinus: No maxillary sinus tenderness or frontal sinus tenderness.      Mouth/Throat:      Mouth: Mucous membranes are moist.      Pharynx: Oropharynx " is clear. No posterior oropharyngeal erythema.   Eyes:      Extraocular Movements: Extraocular movements intact.      Conjunctiva/sclera: Conjunctivae normal.      Pupils: Pupils are equal, round, and reactive to light.   Cardiovascular:      Rate and Rhythm: Normal rate and regular rhythm.      Heart sounds: Normal heart sounds.   Pulmonary:      Effort: Pulmonary effort is normal. No respiratory distress.      Breath sounds: Normal breath sounds. No wheezing.   Musculoskeletal:      Cervical back: Normal range of motion. No rigidity.   Lymphadenopathy:      Cervical: No cervical adenopathy.   Skin:     General: Skin is warm and dry.   Neurological:      Mental Status: He is alert and oriented to person, place, and time.   Psychiatric:         Mood and Affect: Mood normal.         Behavior: Behavior normal.              Procedures                    Lab Results (last 24 hours)     Procedure Component Value Units Date/Time    POCT SARS-CoV-2 Antigen TORY [[id number]]  (Normal) Collected: 05/25/22 1629    Specimen: Swab Updated: 05/25/22 1630     SARS Antigen Not Detected     Internal Control Passed     Lot Number [id number]     Expiration Date 9/12/23    POCT Influenza A/B [[id number]]  (Normal) Collected: 05/25/22 1631    Specimen: Swab Updated: 05/25/22 1632     Rapid Influenza A Ag Negative     Rapid Influenza B Ag Negative     Internal Control Passed     Lot Number [id number]     Expiration Date 6/4/23                No Radiology Exams Resulted Within Past 24 Hours                    Diagnoses and all orders for this visit:    1. Acute non-recurrent sinusitis, unspecified location (Primary)  Comments:  Will stop antibioitic if PCR test is positive. If no improvement, to consider ENT or allergy referral.   Orders:  -     doxycycline (VIBRAMYCIN) 100 MG capsule; Take 1 capsule by mouth 2 (Two) Times a Day for 10 days.  Dispense: 20 capsule; Refill: 0    2. Fatigue, unspecified type  -     POCT SARS-CoV-2 Antigen TORY  -      POCT Influenza A/B  -     EBV Antibody Profile; Future    3. Nasal congestion  -     fluticasone (FLONASE) 50 MCG/ACT nasal spray; 2 sprays into the nostril(s) as directed by provider Daily.  Dispense: 18.2 mL; Refill: 0    4. Loss of smell  -     COVID-19,APTIMA PANTHER(KHLOE),[facility], NP/OP SWAB IN UTM/VTM/SALINE TRANSPORT MEDIA,24 HR TAT - Swab, Nasopharynx; Future  -     COVID-19,APTIMA PANTHER(KHLOE),[facility], NP/OP SWAB IN UTM/VTM/SALINE TRANSPORT MEDIA,24 HR TAT - Swab, Nasopharynx    5. Loss of taste  -     COVID-19,APTIMA PANTHER(KHLOE),[facility], NP/OP SWAB IN UTM/VTM/SALINE TRANSPORT MEDIA,24 HR TAT - Swab, Nasopharynx; Future  -     COVID-19,APTIMA PANTHER(KHLOE),[facility], NP/OP SWAB IN UTM/VTM/SALINE TRANSPORT MEDIA,24 HR TAT - Swab, Nasopharynx             Additional Instructions for the Follow-ups that You Need to Schedule     COVID-19,APTIMA PANTHER(KHLOE),[facility], NP/OP SWAB IN UTM/VTM/SALINE TRANSPORT MEDIA,24 HR TAT - Swab, Nasopharynx    May 25, 2022 (Approximate)      Employed in healthcare setting?: No    Symptomatic for COVID-19 as defined by CDC?: Yes    Hospitalized for COVID-19?: No    Admitted to ICU for COVID-19?: No    Resident in a congregate (group) care setting?: No    Release to patient: Immediate         EBV Antibody Profile    May 30, 2022 (Approximate)      Release to patient: Immediate                         FOR FULL DISCHARGE INSTRUCTIONS/COMMENTS/HANDOUTS please see the AVS

## 2022-05-26 LAB — SARS-COV-2 RNA PNL SPEC NAA+PROBE: NOT DETECTED

## 2022-05-26 RX ORDER — FLUTICASONE PROPIONATE 50 MCG
SPRAY, SUSPENSION (ML) NASAL
Qty: 48 G | Refills: 0 | Status: SHIPPED | OUTPATIENT
Start: 2022-05-26 | End: 2022-11-08

## 2022-05-27 LAB
EBV NA IGG SER IA-ACNC: 27.2 U/ML (ref 0–17.9)
EBV VCA IGG SER IA-ACNC: >600 U/ML (ref 0–17.9)
EBV VCA IGM SER IA-ACNC: <36 U/ML (ref 0–35.9)
SERVICE CMNT-IMP: ABNORMAL

## 2022-06-07 ENCOUNTER — HOSPITAL ENCOUNTER (OUTPATIENT)
Dept: MRI IMAGING | Facility: HOSPITAL | Age: 47
Discharge: HOME | End: 2022-06-07
Admitting: NURSE PRACTITIONER

## 2022-06-07 DIAGNOSIS — G89.29 CHRONIC LEFT SHOULDER PAIN: ICD-10-CM

## 2022-06-07 DIAGNOSIS — M25.512 CHRONIC LEFT SHOULDER PAIN: ICD-10-CM

## 2022-06-07 PROCEDURE — 73221 MRI JOINT UPR EXTREM W/O DYE: CPT

## 2022-06-07 RX ORDER — PREDNISONE 20 MG/1
TABLET ORAL
Qty: 19 TABLET | Refills: 0 | Status: SHIPPED | OUTPATIENT
Start: 2022-06-07 | End: 2022-06-17

## 2022-06-07 RX ORDER — MELOXICAM 7.5 MG/1
7.5 TABLET ORAL DAILY
Qty: 90 TABLET | Refills: 0 | Status: SHIPPED | OUTPATIENT
Start: 2022-06-07 | End: 2022-11-07

## 2022-07-05 ENCOUNTER — OFFICE VISIT (OUTPATIENT)
Dept: SLEEP MEDICINE | Facility: HOSPITAL | Age: 47
End: 2022-07-05

## 2022-07-05 VITALS
WEIGHT: 253 LBS | HEIGHT: 75 IN | HEART RATE: 64 BPM | SYSTOLIC BLOOD PRESSURE: 113 MMHG | OXYGEN SATURATION: 97 % | DIASTOLIC BLOOD PRESSURE: 69 MMHG | BODY MASS INDEX: 31.46 KG/M2

## 2022-07-05 DIAGNOSIS — G47.10 HYPERSOMNIA: ICD-10-CM

## 2022-07-05 DIAGNOSIS — E66.9 OBESITY (BMI 30-39.9): ICD-10-CM

## 2022-07-05 DIAGNOSIS — G47.33 OSA (OBSTRUCTIVE SLEEP APNEA): Primary | ICD-10-CM

## 2022-07-05 DIAGNOSIS — G47.8 NON-RESTORATIVE SLEEP: ICD-10-CM

## 2022-07-05 PROCEDURE — 99204 OFFICE O/P NEW MOD 45 MIN: CPT | Performed by: FAMILY MEDICINE

## 2022-07-05 PROCEDURE — G0463 HOSPITAL OUTPT CLINIC VISIT: HCPCS | Performed by: FAMILY MEDICINE

## 2022-07-05 NOTE — PROGRESS NOTES
[facility] New Patient/Consultation       Reason for Consultation: Sleep apnea unspecified type      Patient Care Team:  Haydee Leigh APRN as PCP - General (Nurse Practitioner)  Barrera Loera MD as Consulting Physician (Sleep Medicine)      History of present illness:  Thank you for asking me to see your patient.  The patient is a 46 y.o. male with hypertension ADD depression presents today to establish care for JAS.  Reports sleep study several years ago on a Pap breathing machine currently.  Feels like machine may not be working as well as it used to.  Reports bedtime 11 PM sleep latency 1 to 2 hours wake time 7 AM sleep 7 to 8 hours 1 nap on the weekends no rotating shifts.  Fullface mask.  Reports hypersomnia nonrestorative sleep snoring.  There is a family history of sleep apnea.  Obese BMI 31.6.    Date range from machine 4/6/2022 - 7/4/2022 average use is 5 hours 3 minutes pressure range 9-10.6 cm H2O.  Average pressure 10.2 cm H2O average AHI 0.5.    Social History: Former no tobacco alcohol caffeine use    Allergies:  Patient has no known allergies.    Family History: JAS yes       Current Outpatient Medications:   •  ALPRAZolam XR (XANAX XR) 0.5 MG 24 hr tablet, Take 1-2 tablets by mouth Daily As Needed., Disp: , Rfl:   •  amLODIPine (NORVASC) 10 MG tablet, TAKE 1 TABLET(10 MG) BY MOUTH EVERY DAY, Disp: 90 tablet, Rfl: 0  •  Cholecalciferol 50 MCG (2000 UT) tablet, Vitamin D3 50 mcg (2,000 unit) tablet  Take 1 tablet every day by oral route., Disp: , Rfl:   •  fluticasone (FLONASE) 50 MCG/ACT nasal spray, SHAKE LIQUID WELL AND USE 2 SPRAYS IN EACH NOSTRIL ONCE DAILY., Disp: 48 g, Rfl: 0  •  losartan (COZAAR) 100 MG tablet, Take 1 tablet by mouth Daily., Disp: 90 tablet, Rfl: 0  •  meloxicam (Mobic) 7.5 MG tablet, Take 1 tablet by mouth Daily., Disp: 90 tablet, Rfl: 0  •  methylphenidate (RITALIN) 10 MG tablet, TAKE 1 OR 2 TABLETS BY MOUTH THREE TIMES DAILY AS DIRECTED, Disp: , Rfl:   •   "vitamin D (ERGOCALCIFEROL) 1.25 MG (48169 UT) capsule capsule, Take 1 capsule by mouth Every 7 (Seven) Days., Disp: 8 capsule, Rfl: 0    Vital Signs:    Vitals:    07/05/22 1100   BP: 113/69   Pulse: 64   SpO2: 97%   Weight: 115 kg (253 lb)   Height: 190.5 cm (75\")      Body mass index is 31.62 kg/m².         REVIEW OF SYSTEMS.  Full review of systems available on the intake form which is scanned in the media tab.  The relevant positive are noted below  1. Daytime excessive sleepiness with [place] Sleepiness Scale :Total score: 9   2. Snoring  3. Anxiety depression      Physical exam:  Vitals:    07/05/22 1100   BP: 113/69   Pulse: 64   SpO2: 97%   Weight: 115 kg (253 lb)   Height: 190.5 cm (75\")    Body mass index is 31.62 kg/m².    HEENT: Head is atraumatic, normocephalic  Eyes: pupils are round equal and reacting to light and accommodation, conjunctiva normal  RESPIRATORY SYSTEM: Regular respirations  CARDIOVASULAR SYSTEM: Regular rate  EXTREMITES: No cyanosis, clubbing  NEUROLOGICAL SYSTEM: Oriented x 3, no gross motor defects, gait normal      Impression:  1. JAS (obstructive sleep apnea)    2. Hypersomnia    3. Non-restorative sleep    4. Obesity (BMI 30-39.9)        Plan:    Good sleep hygiene measures should be maintained.  Weight loss would be beneficial in this patient who is obese BMI 31.6.    Patient uses the CPAP device and benefits from its use in terms of reduction of hypersomnia and snoring however still having significant issues in terms of hypersomnia on restorative sleep.Weight loss will be strongly beneficial to reduce the severity of sleep-disordered breathing.  Caution during activities that require prolonged concentration is strongly advised if sleepiness returns. Changing of PAP supplies regularly is important for effective use. Patient needs to change cushion on the mask or plugs on nasal pillows along with disposable filters once every month and change mask frame, tubing, headgear and " Velcro straps every 6 months at the minimum.    Machine is over 5 years old. Due for new machine.     Follow-up home sleep study to reassess severity of sleep apnea and place order for new machine.    Thank you for allowing me to participate in your patient's care.    Barrera Loera MD  Sleep Medicine  07/05/22  12:06 EDT

## 2022-07-06 ENCOUNTER — OFFICE VISIT (OUTPATIENT)
Dept: ORTHOPEDIC SURGERY | Facility: CLINIC | Age: 47
End: 2022-07-06

## 2022-07-06 VITALS — HEART RATE: 75 BPM | OXYGEN SATURATION: 97 % | BODY MASS INDEX: 32.06 KG/M2 | HEIGHT: 75 IN | WEIGHT: 257.8 LBS

## 2022-07-06 DIAGNOSIS — M75.82 ROTATOR CUFF TENDINITIS, LEFT: ICD-10-CM

## 2022-07-06 DIAGNOSIS — M75.02 ADHESIVE CAPSULITIS OF LEFT SHOULDER: Primary | ICD-10-CM

## 2022-07-06 PROCEDURE — 20610 DRAIN/INJ JOINT/BURSA W/O US: CPT | Performed by: ORTHOPAEDIC SURGERY

## 2022-07-06 PROCEDURE — 99203 OFFICE O/P NEW LOW 30 MIN: CPT | Performed by: ORTHOPAEDIC SURGERY

## 2022-07-06 RX ORDER — LIDOCAINE HYDROCHLORIDE 10 MG/ML
9 INJECTION, SOLUTION INFILTRATION; PERINEURAL
Status: COMPLETED | OUTPATIENT
Start: 2022-07-06 | End: 2022-07-06

## 2022-07-06 RX ORDER — TRIAMCINOLONE ACETONIDE 40 MG/ML
40 INJECTION, SUSPENSION INTRA-ARTICULAR; INTRAMUSCULAR
Status: COMPLETED | OUTPATIENT
Start: 2022-07-06 | End: 2022-07-06

## 2022-07-06 RX ORDER — PHENELZINE SULFATE 15 MG/1
15 TABLET ORAL 3 TIMES DAILY
COMMUNITY
End: 2022-11-07

## 2022-07-06 RX ADMIN — LIDOCAINE HYDROCHLORIDE 9 ML: 10 INJECTION, SOLUTION INFILTRATION; PERINEURAL at 09:20

## 2022-07-06 RX ADMIN — TRIAMCINOLONE ACETONIDE 40 MG: 40 INJECTION, SUSPENSION INTRA-ARTICULAR; INTRAMUSCULAR at 09:20

## 2022-07-06 NOTE — PROGRESS NOTES
"Chief Complaint  Initial Evaluation of the Left Shoulder     Subjective      Robbin Myers presents to [facility] for an evaluation of left shoulder. He has been having pain and difficulty with shoulder range of motion for 6 months. He is present today with MRI results and x-rays of the left shoulder. Pain in the shoulder has remained persistent. He has not noticed improvement in the left shoulder.     No Known Allergies     Social History     Socioeconomic History   • Marital status:    Tobacco Use   • Smoking status: Never Smoker   • Smokeless tobacco: Never Used   Substance and Sexual Activity   • Alcohol use: Yes     Comment: 1x weekly, 2-3 beers or bourbon drinks   • Drug use: Defer   • Sexual activity: Yes     Partners: Female        Review of Systems     Objective   Vital Signs:   Pulse 75   Ht 190.5 cm (75\")   Wt 117 kg (257 lb 12.8 oz)   SpO2 97%   BMI 32.22 kg/m²       Physical Exam  Constitutional:       Appearance: Normal appearance. Patient is well-developed and normal weight.   HENT:      Head: Normocephalic.      Right Ear: Hearing and external ear normal.      Left Ear: Hearing and external ear normal.      Nose: Nose normal.   Eyes:      Conjunctiva/sclera: Conjunctivae normal.   Cardiovascular:      Rate and Rhythm: Normal rate.   Pulmonary:      Effort: Pulmonary effort is normal.      Breath sounds: No wheezing or rales.   Abdominal:      Palpations: Abdomen is soft.      Tenderness: There is no abdominal tenderness.   Musculoskeletal:      Cervical back: Normal range of motion.   Skin:     Findings: No rash.   Neurological:      Mental Status: Patient  is alert and oriented to person, place, and time.   Psychiatric:         Mood and Affect: Mood and affect normal.         Judgment: Judgment normal.       Ortho Exam      LEFT SHOULDER: Active elevation to 160 degrees, passive to 180 degrees with pain. IR to L4 compared to the right shoulder going to T8 " with pain. Abduction to 90 degrees with pain. No swelling, skin discoloration or atrophy. Good tone of deltoid, biceps, triceps, wrist extensors, and wrist flexors.  Sensation grossly intact. Neurovascular intact.  Radial pulse 2+, ulnar pulse 2+. Pain with empty can testing but good strength.       Left shoulder  Date/Time: 7/6/2022 9:20 AM  Consent given by: patient  Site marked: site marked  Timeout: Immediately prior to procedure a time out was called to verify the correct patient, procedure, equipment, support staff and site/side marked as required   Supporting Documentation  Indications: pain   Procedure Details  Location: shoulder (left shoulder) -   Needle size: 22 G  Medications administered: 9 mL lidocaine 1 %; 40 mg triamcinolone acetonide 40 MG/ML  Patient tolerance: patient tolerated the procedure well with no immediate complications              Imaging Results (Most Recent)     None           Result Review :         MRI Shoulder Left Without Contrast    Result Date: 6/7/2022  Narrative: PROCEDURE: MRI SHOULDER LEFT WO CONTRAST  COMPARISON: [facility] XR SHOULDER 2+ VW LEFT, 5/11/2022, 9:01.  INDICATIONS: LEFT SHOULDER PAIN WITH PAINFUL RANGE OF MOTION X 6-7 MONTHS, NO KNOWN INJURY      TECHNIQUE: A variety of imaging planes and parameters were utilized for visualization of suspected pathology.  Images were performed without contrast.   FINDINGS:  There is suspicion for supraspinatus, infraspinatus, and subscapularis tendinopathy.  No definite high-grade rotator cuff tendon defect is seen.  The teres minor tendon appears intact.  The long head of the biceps tendon appears normally positioned in the bicipital groove.  There is a small amount of fluid in the biceps tendon sheath.  No definite biceps tendon tear.  No significant rotator cuff or deltoid muscle edema or atrophy is seen.  There does not appear to be significant fluid in the subacromial/subdeltoid bursa.  Glenohumeral  alignment appears within normal limits.  No significant joint effusion.  There appears to be thickening and hyperintense signal in the capsular tissues at the axillary recess.  There also appears to be edema signal in the capsular soft tissues at the posterior-superior joint and rotator cuff interval.  These findings are suspicious for capsulitis.  There does not appear to be significant glenohumeral arthropathy at this time.  No paralabral cyst is identified.  There is hyperintense signal and distortion at the anterior-inferior labrum which could indicate a tear and/or degeneration.  There is thickening of the middle glenohumeral ligament, likely a normal variant.  No evidence of fracture.  There are mild degenerative changes at the acromioclavicular joint.  Alignment is within normal limits.  No significant periarticular edema.  No definite axillary adenopathy.      Impression:   1. Thickening and edema signal of the capsular soft tissues of the glenohumeral joint suspicious for adhesive capsulitis. 2. Rotator cuff tendinopathy without definite high-grade tendon tear. 3. Hyperintense signal and distortion at the anterior-inferior labrum which could indicate tear and/or degeneration. 4. Small amount of fluid in the biceps tendon sheath which may be seen with capsulitis or biceps tenosynovitis.      RENAN CH MD       Electronically Signed and Approved By: RENAN CH MD on 6/07/2022 at 13:06                      Assessment and Plan     Diagnoses and all orders for this visit:    1. Adhesive capsulitis of left shoulder (Primary)    2. Rotator cuff tendinitis, left        Patient to regain the little bit of motion he lost, he is advised to work on range of motion. He has a mild frozen shoulder. A prescription for PT written. A left shoulder injection given, patient tolerated this well.     Call or return if worsening symptoms.    Follow Up     4-6 weeks if failing to improve.       Patient was given  instructions and counseling regarding his condition or for health maintenance advice. Please see specific information pulled into the AVS if appropriate.     Scribed for Ryan Paulson MD by Melanie Will.  07/06/22   09:13 EDT    I have personally performed the services described in this document as scribed by the above individual and it is both accurate and complete. Ryan Paulson MD 07/06/22

## 2022-07-14 ENCOUNTER — TELEPHONE (OUTPATIENT)
Dept: FAMILY MEDICINE CLINIC | Age: 47
End: 2022-07-14

## 2022-08-11 ENCOUNTER — TELEPHONE (OUTPATIENT)
Dept: FAMILY MEDICINE CLINIC | Age: 47
End: 2022-08-11

## 2022-08-11 RX ORDER — AMLODIPINE BESYLATE 10 MG/1
10 TABLET ORAL DAILY
Qty: 90 TABLET | Refills: 0 | Status: CANCELLED | OUTPATIENT
Start: 2022-08-11

## 2022-08-11 NOTE — TELEPHONE ENCOUNTER
Caller: Robbin Myers    Relationship: Self    Best call back number: [phone]    Requested Prescriptions:   Requested Prescriptions     Pending Prescriptions Disp Refills   • amLODIPine (NORVASC) 10 MG tablet 90 tablet 0     Sig: Take 1 tablet by mouth Daily.        Pharmacy where request should be sent: [id number] - [place], [place] - [address] AT [facility] OF [address] [facility] - [phone]  - [phone] FX     Additional details provided by patient: PATIENT IS OUT OF MEDICATION    Does the patient have less than a 3 day supply:  [x] Yes  [] No    Polo Lindsey Rep   08/11/22 16:28 EDT

## 2022-08-12 RX ORDER — AMLODIPINE BESYLATE 10 MG/1
10 TABLET ORAL DAILY
Qty: 90 TABLET | Refills: 0 | Status: SHIPPED | OUTPATIENT
Start: 2022-08-12 | End: 2022-08-26

## 2022-08-25 RX ORDER — LOSARTAN POTASSIUM 100 MG/1
100 TABLET ORAL DAILY
Qty: 90 TABLET | Refills: 0 | Status: SHIPPED | OUTPATIENT
Start: 2022-08-25 | End: 2022-11-08 | Stop reason: SDUPTHER

## 2022-08-26 ENCOUNTER — OFFICE VISIT (OUTPATIENT)
Dept: FAMILY MEDICINE CLINIC | Age: 47
End: 2022-08-26

## 2022-08-26 ENCOUNTER — LAB (OUTPATIENT)
Dept: LAB | Facility: HOSPITAL | Age: 47
End: 2022-08-26

## 2022-08-26 VITALS
WEIGHT: 259 LBS | HEIGHT: 75 IN | BODY MASS INDEX: 32.2 KG/M2 | HEART RATE: 80 BPM | RESPIRATION RATE: 16 BRPM | OXYGEN SATURATION: 96 % | DIASTOLIC BLOOD PRESSURE: 78 MMHG | SYSTOLIC BLOOD PRESSURE: 118 MMHG

## 2022-08-26 DIAGNOSIS — R53.83 OTHER FATIGUE: ICD-10-CM

## 2022-08-26 DIAGNOSIS — G47.30 SLEEP APNEA, UNSPECIFIED TYPE: ICD-10-CM

## 2022-08-26 DIAGNOSIS — I10 PRIMARY HYPERTENSION: ICD-10-CM

## 2022-08-26 DIAGNOSIS — E55.9 VITAMIN D DEFICIENCY: ICD-10-CM

## 2022-08-26 DIAGNOSIS — R42 DIZZINESS: ICD-10-CM

## 2022-08-26 DIAGNOSIS — R74.01 ELEVATED ALT MEASUREMENT: ICD-10-CM

## 2022-08-26 DIAGNOSIS — R53.83 OTHER FATIGUE: Primary | ICD-10-CM

## 2022-08-26 LAB
25(OH)D3 SERPL-MCNC: 31.1 NG/ML (ref 30–100)
ALBUMIN SERPL-MCNC: 4.7 G/DL (ref 3.5–5.2)
ALBUMIN/GLOB SERPL: 2.2 G/DL
ALP SERPL-CCNC: 61 U/L (ref 39–117)
ALT SERPL W P-5'-P-CCNC: 47 U/L (ref 1–41)
ANION GAP SERPL CALCULATED.3IONS-SCNC: 12.1 MMOL/L (ref 5–15)
AST SERPL-CCNC: 31 U/L (ref 1–40)
BASOPHILS # BLD AUTO: 0.03 10*3/MM3 (ref 0–0.2)
BASOPHILS NFR BLD AUTO: 0.4 % (ref 0–1.5)
BILIRUB SERPL-MCNC: 0.4 MG/DL (ref 0–1.2)
BUN SERPL-MCNC: 12 MG/DL (ref 6–20)
BUN/CREAT SERPL: 10.5 (ref 7–25)
CALCIUM SPEC-SCNC: 9.7 MG/DL (ref 8.6–10.5)
CHLORIDE SERPL-SCNC: 105 MMOL/L (ref 98–107)
CO2 SERPL-SCNC: 26.9 MMOL/L (ref 22–29)
CREAT SERPL-MCNC: 1.14 MG/DL (ref 0.76–1.27)
DEPRECATED RDW RBC AUTO: 43.5 FL (ref 37–54)
EGFRCR SERPLBLD CKD-EPI 2021: 79.3 ML/MIN/1.73
EOSINOPHIL # BLD AUTO: 0.05 10*3/MM3 (ref 0–0.4)
EOSINOPHIL NFR BLD AUTO: 0.7 % (ref 0.3–6.2)
ERYTHROCYTE [DISTWIDTH] IN BLOOD BY AUTOMATED COUNT: 13.2 % (ref 12.3–15.4)
GLOBULIN UR ELPH-MCNC: 2.1 GM/DL
GLUCOSE SERPL-MCNC: 97 MG/DL (ref 65–99)
HCT VFR BLD AUTO: 45.8 % (ref 37.5–51)
HGB BLD-MCNC: 14.5 G/DL (ref 13–17.7)
IMM GRANULOCYTES # BLD AUTO: 0.02 10*3/MM3 (ref 0–0.05)
IMM GRANULOCYTES NFR BLD AUTO: 0.3 % (ref 0–0.5)
LYMPHOCYTES # BLD AUTO: 1.9 10*3/MM3 (ref 0.7–3.1)
LYMPHOCYTES NFR BLD AUTO: 28.3 % (ref 19.6–45.3)
MCH RBC QN AUTO: 28 PG (ref 26.6–33)
MCHC RBC AUTO-ENTMCNC: 31.7 G/DL (ref 31.5–35.7)
MCV RBC AUTO: 88.6 FL (ref 79–97)
MONOCYTES # BLD AUTO: 0.63 10*3/MM3 (ref 0.1–0.9)
MONOCYTES NFR BLD AUTO: 9.4 % (ref 5–12)
NEUTROPHILS NFR BLD AUTO: 4.08 10*3/MM3 (ref 1.7–7)
NEUTROPHILS NFR BLD AUTO: 60.9 % (ref 42.7–76)
PLATELET # BLD AUTO: 262 10*3/MM3 (ref 140–450)
PMV BLD AUTO: 9.1 FL (ref 6–12)
POTASSIUM SERPL-SCNC: 4.8 MMOL/L (ref 3.5–5.2)
PROT SERPL-MCNC: 6.8 G/DL (ref 6–8.5)
RBC # BLD AUTO: 5.17 10*6/MM3 (ref 4.14–5.8)
SODIUM SERPL-SCNC: 144 MMOL/L (ref 136–145)
WBC NRBC COR # BLD: 6.71 10*3/MM3 (ref 3.4–10.8)

## 2022-08-26 PROCEDURE — 80053 COMPREHEN METABOLIC PANEL: CPT

## 2022-08-26 PROCEDURE — 99214 OFFICE O/P EST MOD 30 MIN: CPT | Performed by: NURSE PRACTITIONER

## 2022-08-26 PROCEDURE — 82306 VITAMIN D 25 HYDROXY: CPT

## 2022-08-26 PROCEDURE — 85025 COMPLETE CBC W/AUTO DIFF WBC: CPT

## 2022-08-26 PROCEDURE — 36415 COLL VENOUS BLD VENIPUNCTURE: CPT

## 2022-08-26 RX ORDER — DULOXETIN HYDROCHLORIDE 60 MG/1
30 CAPSULE, DELAYED RELEASE ORAL DAILY
COMMUNITY
End: 2022-11-07

## 2022-08-26 NOTE — ASSESSMENT & PLAN NOTE
-we will dc amlodipine as this could be causing his dizziness if BP is too low, could also be causing him to fee fatigued  -patient was told he will need to mon BP at home and let office know if BP const. >135/85 as we would need to add another med back on to his regimen  -he will cont losartan 100 mg qday for now   -cont wt loss efforts, exercise, and low NA diet

## 2022-08-26 NOTE — PROGRESS NOTES
"Robbin Myers presents to [facility] with complaint of  Dizziness (Fatigue, lightheaded, dizziness, started about 1-2 weeks ago. Feels like it has been getting worse. Fatigue is daily. Lightheaded and dizziness happens when exercising, reports exercises 1-2 times a day. Exercises include fast walking and jogging. )    SUBJECTIVE  History of Present Illness     Patient is here for dizziness, feeling lightheaded and having fatigue. The fatigue has been ongoing for him, but he feels it has gotten worse in the past few months. He does not check his blood pressure at home, but today his BP is about 10 points lower systolic than usual. He denies ear pain or pressure. He says he gets 8 hours of sleep per night and wakes up feeling refreshed like he got quality sleep. Labs back in May showed Vit d def. He was on 50,000 units weekly, has finished this,  but has not been able to have vit D level rechecked. He was also referred to cardiology for CP and SOA with exertion but patient cancelled referral as these symptoms had resolved. He had EKG that was normal. Today he declines having any SOA or CP.     The following portions of the patient's history were reviewed and updated as appropriate: allergies, current medications, past family history, past medical history, past social history, past surgical history and problem list.    OBJECTIVE  Vital Signs:   /78 (BP Location: Left arm, Patient Position: Standing, Cuff Size: Large Adult)   Pulse 80   Resp 16   Ht 190.5 cm (75\")   Wt 117 kg (259 lb)   SpO2 96% Comment: Room air  BMI 32.37 kg/m²       Vitals:    08/26/22 1308 08/26/22 1313 08/26/22 1314 08/26/22 1315   Orthostatic BP:  113/75 116/78 111/77   Orthostatic Pulse:  70 87 81   Patient Position: Standing Lying Sitting Standing           Physical Exam  Constitutional:       General: He is not in acute distress.     Appearance: Normal appearance. He is not ill-appearing.   HENT: "      Head: Normocephalic and atraumatic.      Right Ear: Tympanic membrane and ear canal normal.      Left Ear: Tympanic membrane and ear canal normal.      Nose: Nose normal.      Mouth/Throat:      Mouth: Mucous membranes are moist.   Cardiovascular:      Rate and Rhythm: Normal rate and regular rhythm.      Pulses: Normal pulses.      Heart sounds: Normal heart sounds.   Pulmonary:      Effort: Pulmonary effort is normal. No respiratory distress.      Breath sounds: Normal breath sounds.   Chest:      Chest wall: No tenderness.   Musculoskeletal:         General: Normal range of motion.      Cervical back: Normal range of motion and neck supple.   Skin:     General: Skin is warm and dry.      Capillary Refill: Capillary refill takes less than 2 seconds.   Neurological:      General: No focal deficit present.      Mental Status: He is alert and oriented to person, place, and time. Mental status is at baseline.      Cranial Nerves: Cranial nerves are intact.      Motor: Motor function is intact.      Coordination: Coordination is intact.      Gait: Gait is intact.   Psychiatric:         Mood and Affect: Mood normal.         Behavior: Behavior normal.          Results Review:  The following data was reviewed by Haydee Leigh, STEPHANE [unfilled] 13:03 EDT.   Magnesium (05/11/2022 08:37)  Vitamin B12 & Folate (05/11/2022 08:37)  Vitamin D 25 Hydroxy (05/11/2022 08:37)  TSH (05/11/2022 08:37)  Hemoglobin A1c (05/11/2022 08:37)  CBC & Differential (05/11/2022 08:37)  Comprehensive Metabolic Panel (05/11/2022 08:37)  Lipid Panel (05/11/2022 08:37)  Testosterone, Free, Total (05/11/2022 08:37)      ASSESSMENT AND PLAN:  Diagnoses and all orders for this visit:    1. Other fatigue (Primary)  Comments:  -pt would like to check CBC and CMP again today   Orders:  -     CBC & Differential; Future  -     Comprehensive Metabolic Panel; Future    2. Primary hypertension  Assessment & Plan:  -we will dc amlodipine as this could be causing  his dizziness if BP is too low, could also be causing him to fee fatigued  -patient was told he will need to mon BP at home and let office know if BP const. >135/85 as we would need to add another med back on to his regimen  -he will cont losartan 100 mg qday for now   -cont wt loss efforts, exercise, and low NA diet       3. Dizziness  Assessment & Plan:  -patient does not have any other concerning neurological symptoms  -possible cause hypotension, so dc'ing amlodipine to see if this helps   -orthostatics neg today in office       4. Vitamin D deficiency  Comments:  -pt having this lab checked today, order already in chart     5. Sleep apnea, unspecified type  Comments:  -he is wearing cpap, was not able to do new sleep study as they were out of equipt per pe      Follow Up   Return in about 1 month (around 9/26/2022), or if symptoms worsen or fail to improve. Patient to notify office with any acute concerns or issues.  Patient verbalizes understanding, agrees with plan of care and has no further questions upon discharge.     Patient was given instructions and counseling regarding his condition or for health maintenance advice. Please see specific information pulled into the AVS if appropriate.     Discussed the importance of following up with any needed screening tests/labs/specialist appointments and any requested follow-up recommended by me today. Importance of maintaining follow-up discussed and patient accepts that missed appointments can delay diagnosis and potentially lead to worsening of conditions.

## 2022-08-26 NOTE — ASSESSMENT & PLAN NOTE
-patient does not have any other concerning neurological symptoms  -possible cause hypotension, so dc'ing amlodipine to see if this helps   -orthostatics neg today in office

## 2022-10-11 ENCOUNTER — TELEPHONE (OUTPATIENT)
Dept: FAMILY MEDICINE CLINIC | Age: 47
End: 2022-10-11

## 2022-10-11 NOTE — TELEPHONE ENCOUNTER
----- Message from Ana Luisa Kimbrough LPN sent at 10/10/2022  8:12 AM EDT -----      ----- Message -----  From: SYSTEM  Sent: 10/8/2022   1:36 AM EDT  To: [facility] [place]

## 2022-10-19 ENCOUNTER — TELEPHONE (OUTPATIENT)
Dept: FAMILY MEDICINE CLINIC | Age: 47
End: 2022-10-19

## 2022-10-26 NOTE — TELEPHONE ENCOUNTER
October 19, 2022  Ana Luisa Kimbrough LPN JB    12:47 PM  Note   Summary: over due lab    Pt inf cmp due-2nd attempt

## 2022-10-28 ENCOUNTER — LAB (OUTPATIENT)
Dept: LAB | Facility: HOSPITAL | Age: 47
End: 2022-10-28

## 2022-10-28 DIAGNOSIS — R74.01 ELEVATED ALT MEASUREMENT: ICD-10-CM

## 2022-10-28 LAB
ALBUMIN SERPL-MCNC: 4.6 G/DL (ref 3.5–5.2)
ALBUMIN/GLOB SERPL: 1.9 G/DL
ALP SERPL-CCNC: 62 U/L (ref 39–117)
ALT SERPL W P-5'-P-CCNC: 33 U/L (ref 1–41)
ANION GAP SERPL CALCULATED.3IONS-SCNC: 10.9 MMOL/L (ref 5–15)
AST SERPL-CCNC: 22 U/L (ref 1–40)
BILIRUB SERPL-MCNC: 0.3 MG/DL (ref 0–1.2)
BUN SERPL-MCNC: 13 MG/DL (ref 6–20)
BUN/CREAT SERPL: 14 (ref 7–25)
CALCIUM SPEC-SCNC: 9.5 MG/DL (ref 8.6–10.5)
CHLORIDE SERPL-SCNC: 104 MMOL/L (ref 98–107)
CO2 SERPL-SCNC: 28.1 MMOL/L (ref 22–29)
CREAT SERPL-MCNC: 0.93 MG/DL (ref 0.76–1.27)
EGFRCR SERPLBLD CKD-EPI 2021: 101.3 ML/MIN/1.73
GLOBULIN UR ELPH-MCNC: 2.4 GM/DL
GLUCOSE SERPL-MCNC: 115 MG/DL (ref 65–99)
POTASSIUM SERPL-SCNC: 4.2 MMOL/L (ref 3.5–5.2)
PROT SERPL-MCNC: 7 G/DL (ref 6–8.5)
SODIUM SERPL-SCNC: 143 MMOL/L (ref 136–145)

## 2022-10-28 PROCEDURE — 80053 COMPREHEN METABOLIC PANEL: CPT

## 2022-10-28 PROCEDURE — 36415 COLL VENOUS BLD VENIPUNCTURE: CPT

## 2022-11-07 ENCOUNTER — OFFICE VISIT (OUTPATIENT)
Dept: FAMILY MEDICINE CLINIC | Age: 47
End: 2022-11-07

## 2022-11-07 VITALS
TEMPERATURE: 98.8 F | HEIGHT: 75 IN | BODY MASS INDEX: 32.08 KG/M2 | HEART RATE: 74 BPM | DIASTOLIC BLOOD PRESSURE: 82 MMHG | SYSTOLIC BLOOD PRESSURE: 113 MMHG | WEIGHT: 258 LBS | OXYGEN SATURATION: 97 %

## 2022-11-07 DIAGNOSIS — F41.9 ANXIETY: Primary | ICD-10-CM

## 2022-11-07 DIAGNOSIS — I10 PRIMARY HYPERTENSION: ICD-10-CM

## 2022-11-07 PROCEDURE — 99213 OFFICE O/P EST LOW 20 MIN: CPT

## 2022-11-07 RX ORDER — AMLODIPINE BESYLATE 10 MG/1
10 TABLET ORAL DAILY
Qty: 5 TABLET | Refills: 0 | Status: SHIPPED | OUTPATIENT
Start: 2022-11-07 | End: 2022-11-07 | Stop reason: SDUPTHER

## 2022-11-07 RX ORDER — AMLODIPINE BESYLATE 10 MG/1
10 TABLET ORAL DAILY
COMMUNITY
End: 2022-11-07 | Stop reason: SDUPTHER

## 2022-11-07 RX ORDER — AMLODIPINE BESYLATE 10 MG/1
10 TABLET ORAL DAILY
Qty: 5 TABLET | Refills: 0 | Status: SHIPPED | OUTPATIENT
Start: 2022-11-07 | End: 2022-11-08 | Stop reason: SDUPTHER

## 2022-11-07 RX ORDER — DULOXETIN HYDROCHLORIDE 30 MG/1
30 CAPSULE, DELAYED RELEASE ORAL DAILY
COMMUNITY
Start: 2022-08-29

## 2022-11-07 NOTE — PROGRESS NOTES
Subjective     CHIEF COMPLAINT    Chief Complaint   Patient presents with   • Anxiety     Clammy, anxious, tense, weak, no energy, couldn't think clearly. symptoms started around 1130 this morning      History of Present Illness  Patient is a 47 year old male who presents to the clinic today with complaints of anxiety. He reports feeling tense with obsessive worry today. He couldn't think straight. He was at work during this episode. Works as a . Episode lasted around 2-3 hours, resolved by time of office visit. He is seeing Dr. Steve Collins in [place] for psychiatry, Dr. Collins is managing his psych meds. Patient is on Cymbalta and xanax PRN. No longer taking ritalin. He has not taken his xanax in a few days. Denies any SI/HI. Denies chest pain, SOB, or any other symptoms.       Review of Systems   Constitutional: Negative for chills and fever.   HENT: Negative for congestion and sore throat.    Respiratory: Negative for cough, shortness of breath and wheezing.    Cardiovascular: Negative for chest pain.   Neurological: Positive for weakness. Negative for syncope, speech difficulty and light-headedness.   Psychiatric/Behavioral: Negative for suicidal ideas. The patient is nervous/anxious.        Past Medical History:   Diagnosis Date   • Anxiety    • Depression    • Hypertension      History reviewed. No pertinent surgical history.    No Known Allergies    Current Outpatient Medications on File Prior to Visit   Medication Sig Dispense Refill   • ALPRAZolam XR (XANAX XR) 0.5 MG 24 hr tablet Take 1-2 tablets by mouth Daily As Needed.     • DULoxetine (CYMBALTA) 30 MG capsule Take 1 capsule by mouth Daily.     • losartan (COZAAR) 100 MG tablet Take 1 tablet by mouth Daily. 90 tablet 0   • [DISCONTINUED] amLODIPine (NORVASC) 10 MG tablet Take 1 tablet by mouth Daily.     • [DISCONTINUED] DULoxetine (CYMBALTA) 60 MG capsule Take 30 mg by mouth Daily.     • Cholecalciferol 50 MCG (2000 UT) tablet Vitamin D3 50  "mcg (2,000 unit) tablet   Take 1 tablet every day by oral route.     • fluticasone (FLONASE) 50 MCG/ACT nasal spray SHAKE LIQUID WELL AND USE 2 SPRAYS IN EACH NOSTRIL ONCE DAILY. 48 g 0   • methylphenidate (RITALIN) 10 MG tablet TAKE 1 OR 2 TABLETS BY MOUTH THREE TIMES DAILY AS DIRECTED     • [DISCONTINUED] meloxicam (Mobic) 7.5 MG tablet Take 1 tablet by mouth Daily. 90 tablet 0   • [DISCONTINUED] phenelzine (NARDIL) 15 MG tablet Take 15 mg by mouth 3 (Three) Times a Day.       No current facility-administered medications on file prior to visit.     /82 (BP Location: Left arm, Patient Position: Sitting, Cuff Size: Adult)   Pulse 74   Temp 98.8 °F (37.1 °C) (Oral)   Ht 190.5 cm (75\")   Wt 117 kg (258 lb)   SpO2 97%   BMI 32.25 kg/m²     Objective     Physical Exam  Vitals and nursing note reviewed.   Constitutional:       General: He is not in acute distress.     Appearance: Normal appearance. He is not ill-appearing.   HENT:      Head: Normocephalic.      Right Ear: Hearing normal.      Left Ear: Hearing normal.   Eyes:      Conjunctiva/sclera: Conjunctivae normal.      Pupils: Pupils are equal, round, and reactive to light.   Cardiovascular:      Rate and Rhythm: Normal rate and regular rhythm.      Pulses: Normal pulses.      Heart sounds: Normal heart sounds. No murmur heard.  Pulmonary:      Effort: Pulmonary effort is normal. No accessory muscle usage or respiratory distress.      Breath sounds: Normal breath sounds. No wheezing or rhonchi.   Musculoskeletal:      Cervical back: Normal range of motion.   Skin:     General: Skin is warm and dry.      Capillary Refill: Capillary refill takes less than 2 seconds.   Neurological:      General: No focal deficit present.      Mental Status: He is alert and oriented to person, place, and time.      Cranial Nerves: No facial asymmetry.      Sensory: Sensation is intact.      Motor: Motor function is intact.      Gait: Gait is intact.   Psychiatric:         " Mood and Affect: Affect normal. Mood is anxious.         Speech: Speech normal.         Behavior: Behavior normal.             Diagnoses and all orders for this visit:    1. Anxiety (Primary)    2. Primary hypertension  Assessment & Plan:  Patient needs refill of amlodipine. 5 days given to cover until appt with PCP. Recommend ambulatory monitoring of BP. Continue losartan.     Orders:  -     Discontinue: amLODIPine (NORVASC) 10 MG tablet; Take 1 tablet by mouth Daily for 5 days.  Dispense: 5 tablet; Refill: 0  -     amLODIPine (NORVASC) 10 MG tablet; Take 1 tablet by mouth Daily.  Dispense: 5 tablet; Refill: 0    Patient to try and take xanax tonight as he hasn't taken it in a few days and see how this helps. Instructed patient to contact psychiatrist for recommendations regarding anxiety. He will also follow up with PCP tomorrow. To ER for any SI/HI. Patient voiced understanding of all instructions and had no further questions at this time.       Return for Next scheduled follow up.      FOR FULL DISCHARGE INSTRUCTIONS/COMMENTS/HANDOUTS please see the AVS

## 2022-11-08 ENCOUNTER — OFFICE VISIT (OUTPATIENT)
Dept: FAMILY MEDICINE CLINIC | Age: 47
End: 2022-11-08

## 2022-11-08 VITALS
DIASTOLIC BLOOD PRESSURE: 76 MMHG | WEIGHT: 259 LBS | HEIGHT: 75 IN | BODY MASS INDEX: 32.2 KG/M2 | HEART RATE: 96 BPM | SYSTOLIC BLOOD PRESSURE: 127 MMHG

## 2022-11-08 DIAGNOSIS — I10 PRIMARY HYPERTENSION: ICD-10-CM

## 2022-11-08 DIAGNOSIS — F33.1 MODERATE EPISODE OF RECURRENT MAJOR DEPRESSIVE DISORDER: ICD-10-CM

## 2022-11-08 DIAGNOSIS — Z23 ENCOUNTER FOR IMMUNIZATION: Primary | ICD-10-CM

## 2022-11-08 DIAGNOSIS — F41.1 GAD (GENERALIZED ANXIETY DISORDER): ICD-10-CM

## 2022-11-08 PROCEDURE — 90686 IIV4 VACC NO PRSV 0.5 ML IM: CPT | Performed by: NURSE PRACTITIONER

## 2022-11-08 PROCEDURE — 99214 OFFICE O/P EST MOD 30 MIN: CPT | Performed by: NURSE PRACTITIONER

## 2022-11-08 PROCEDURE — 90471 IMMUNIZATION ADMIN: CPT | Performed by: NURSE PRACTITIONER

## 2022-11-08 RX ORDER — AMLODIPINE BESYLATE 10 MG/1
10 TABLET ORAL DAILY
Qty: 90 TABLET | Refills: 1 | Status: SHIPPED | OUTPATIENT
Start: 2022-11-08

## 2022-11-08 RX ORDER — LOSARTAN POTASSIUM 100 MG/1
100 TABLET ORAL DAILY
Qty: 90 TABLET | Refills: 1 | Status: SHIPPED | OUTPATIENT
Start: 2022-11-08

## 2022-11-08 NOTE — ASSESSMENT & PLAN NOTE
Patient needs refill of amlodipine. 5 days given to cover until appt with PCP. Recommend ambulatory monitoring of BP. Continue losartan.

## 2022-11-08 NOTE — PROGRESS NOTES
"Robbin Myers presents to [facility] with complaint of  Hypertension and Anxiety    SUBJECTIVE  History of Present Illness     Patient is here for follow-up of hypertension.  He would also like to discuss his anxiety as well.  His hypertension treatment consist of losartan 100 mg daily and amlodipine 10 mg daily.  He is compliant with his medications.  He checks his blood pressure at home and is normally in the 120s to 130s systolic.  His blood pressure is within normal limits today.    In regards to his anxiety, he says he has been dealing with this since he was 17 years old.  He says he has seen several different psychiatrists and has been on several different medications over the years.  He says he has tried every SSRI, and he has been on Wellbutrin and these did not help.  He is currently seeing Dr. Collins, psychiatry he was located in [place].  He is currently on Cymbalta 30 mg daily.  A few months ago this was increased to 60 mg and the patient had to go back down to 30 mg as increased dose was making him feel \"off\" and seemed to worsen his symptoms he says.  Patient was also on lithium back in August of this year.  He mentions that he was also sent to a ketamine clinic for treatment of his anxiety.  He says that this does seem to be effective but he does not like the side effects of this.  He has Xanax that he uses as needed.  He normally only takes this to help him go to sleep a few times per month, but over the past couple of days he says that he has had to take this medication throughout the day to help with his anxiety.  He says his anxiety is to the point where it is causing panic attacks during his workday.  Patient does not want to become reliant on Xanax/benzos and is fearful that this is what will happen as he says this is only medication that seems to give him relief.  Patient would like to see a psychiatrist as he says he is desperate to get help. He says his " "anxiety is driving him into a depression.  He has tried counseling in the past as well and this did not help.  He also tried THC chocolate in the past and this did not help either, said it mostly just make him drowsy.  The patient denies thoughts of suicide, self-harm, or homicide.       OBJECTIVE  Vital Signs:   /76 (BP Location: Right arm, Patient Position: Sitting)   Pulse 96   Ht 190.5 cm (75\")   Wt 117 kg (259 lb)   BMI 32.37 kg/m²       Physical Exam  Constitutional:       General: He is not in acute distress.     Appearance: Normal appearance. He is not ill-appearing.   HENT:      Head: Normocephalic and atraumatic.      Nose: Nose normal.      Mouth/Throat:      Pharynx: Oropharynx is clear.   Cardiovascular:      Rate and Rhythm: Normal rate and regular rhythm.      Pulses: Normal pulses.      Heart sounds: Normal heart sounds.   Pulmonary:      Effort: Pulmonary effort is normal. No respiratory distress.      Breath sounds: Normal breath sounds.   Chest:      Chest wall: No tenderness.   Musculoskeletal:         General: Normal range of motion.      Cervical back: Normal range of motion and neck supple.   Skin:     General: Skin is warm and dry.   Neurological:      General: No focal deficit present.      Mental Status: He is alert and oriented to person, place, and time. Mental status is at baseline.   Psychiatric:         Mood and Affect: Mood is anxious.         Behavior: Behavior normal.          Results Review:  The following data was reviewed by STEPHANE Smith [unfilled] 15:20 EST.  Comprehensive metabolic panel (10/28/2022 08:14)  Comprehensive Metabolic Panel (08/26/2022 13:48)  Vitamin D 25 Hydroxy (08/26/2022 13:48)  CBC & Differential (08/26/2022 13:48)      ASSESSMENT AND PLAN:  Diagnoses and all orders for this visit:    1. Encounter for immunization (Primary)  -     FluLaval/Fluzone >6 mos ([id number])    2. Primary hypertension  Assessment & Plan:  Hypertension is improving with " treatment.  Continue current treatment regimen.  Blood pressure will be reassessed 6 months.    Orders:  -     losartan (COZAAR) 100 MG tablet; Take 1 tablet by mouth Daily.  Dispense: 90 tablet; Refill: 1  -     amLODIPine (NORVASC) 10 MG tablet; Take 1 tablet by mouth Daily.  Dispense: 90 tablet; Refill: 1    3. CAPO (generalized anxiety disorder)  -     Ambulatory Referral to Psychiatry    4. Moderate episode of recurrent major depressive disorder (HCC)    -Per patient request, he was referred to psychiatry to see Gregoria Stark  -We will see if nursing staff can get past few office notes from his current psychiatrist Dr. Collins     Follow Up   Return in about 6 months (around 5/8/2023). Patient to notify office with any acute concerns or issues.  Patient verbalizes understanding, agrees with plan of care and has no further questions upon discharge.     Patient was given instructions and counseling regarding his condition or for health maintenance advice. Please see specific information pulled into the AVS if appropriate.     Discussed the importance of following up with any needed screening tests/labs/specialist appointments and any requested follow-up recommended by me today. Importance of maintaining follow-up discussed and patient accepts that missed appointments can delay diagnosis and potentially lead to worsening of conditions.

## 2023-02-20 NOTE — PROGRESS NOTES
"Chief Complaint  Congestion (Pt c/o nasal congestion, acute cough. Ongoing for 2 weeks./)    Subjective          Robbin Myers presents to [facility]  URI   This is a new problem. The current episode started 1 to 4 weeks ago. The problem has been gradually improving. Maximum temperature: 100. The fever has been present for less than 1 day. Associated symptoms include congestion, coughing (nonproductive), headaches, rhinorrhea, sinus pain (\"maybe a little\"), sneezing, a sore throat and swollen glands (\"mild\"). Pertinent negatives include no diarrhea, ear pain, nausea, plugged ear sensation, vomiting or wheezing. He has tried NSAIDs (Nyquil) for the symptoms. The treatment provided mild relief.       Objective   Vital Signs:   /79 (BP Location: Left arm, Patient Position: Sitting)   Pulse 76   Temp 97.9 °F (36.6 °C) (Oral)   Ht 190.5 cm (75\")   Wt 119 kg (263 lb 6.4 oz)   SpO2 98% Comment: room air  BMI 32.92 kg/m²     Physical Exam  Constitutional:       Appearance: Normal appearance. He is normal weight.   HENT:      Head: Normocephalic.      Right Ear: Tympanic membrane, ear canal and external ear normal.      Left Ear: Tympanic membrane, ear canal and external ear normal.      Nose: Nose normal.      Right Sinus: No maxillary sinus tenderness or frontal sinus tenderness.      Left Sinus: No maxillary sinus tenderness or frontal sinus tenderness.      Mouth/Throat:      Mouth: Mucous membranes are moist.      Pharynx: Oropharynx is clear. No oropharyngeal exudate or posterior oropharyngeal erythema.   Eyes:      Conjunctiva/sclera: Conjunctivae normal.      Pupils: Pupils are equal, round, and reactive to light.   Cardiovascular:      Rate and Rhythm: Normal rate and regular rhythm.      Pulses: Normal pulses.      Heart sounds: Normal heart sounds.   Pulmonary:      Effort: Pulmonary effort is normal. No respiratory distress.      Breath sounds: Normal breath sounds. " No wheezing, rhonchi or rales.   Musculoskeletal:      Cervical back: Normal range of motion.   Lymphadenopathy:      Cervical: No cervical adenopathy.   Neurological:      Mental Status: He is alert and oriented to person, place, and time.   Psychiatric:         Mood and Affect: Mood normal.         Behavior: Behavior normal.         Thought Content: Thought content normal.        Result Review :   The following data was reviewed by: STEPHANE Vega on 02/21/2023:                  Assessment and Plan    Diagnoses and all orders for this visit:    1. Upper respiratory tract infection, unspecified type (Primary)  -     amoxicillin-clavulanate (Augmentin) 875-125 MG per tablet; Take 1 tablet by mouth 2 (Two) Times a Day for 10 days.  Dispense: 20 tablet; Refill: 0          Follow Up   Return if symptoms worsen or fail to improve.  Patient was given instructions and counseling regarding his condition or for health maintenance advice. Please see specific information pulled into the AVS if appropriate.

## 2023-02-21 ENCOUNTER — OFFICE VISIT (OUTPATIENT)
Dept: FAMILY MEDICINE CLINIC | Age: 48
End: 2023-02-21
Payer: COMMERCIAL

## 2023-02-21 VITALS
HEIGHT: 75 IN | SYSTOLIC BLOOD PRESSURE: 117 MMHG | BODY MASS INDEX: 32.75 KG/M2 | OXYGEN SATURATION: 98 % | HEART RATE: 76 BPM | WEIGHT: 263.4 LBS | TEMPERATURE: 97.9 F | DIASTOLIC BLOOD PRESSURE: 79 MMHG

## 2023-02-21 DIAGNOSIS — J06.9 UPPER RESPIRATORY TRACT INFECTION, UNSPECIFIED TYPE: Primary | ICD-10-CM

## 2023-02-21 PROCEDURE — 99213 OFFICE O/P EST LOW 20 MIN: CPT | Performed by: NURSE PRACTITIONER

## 2023-02-21 RX ORDER — AMOXICILLIN AND CLAVULANATE POTASSIUM 875; 125 MG/1; MG/1
1 TABLET, FILM COATED ORAL 2 TIMES DAILY
Qty: 20 TABLET | Refills: 0 | Status: SHIPPED | OUTPATIENT
Start: 2023-02-21 | End: 2023-03-03

## 2023-04-18 ENCOUNTER — OFFICE VISIT (OUTPATIENT)
Dept: FAMILY MEDICINE CLINIC | Age: 48
End: 2023-04-18
Payer: COMMERCIAL

## 2023-04-18 VITALS
DIASTOLIC BLOOD PRESSURE: 86 MMHG | WEIGHT: 260.6 LBS | TEMPERATURE: 98.4 F | HEART RATE: 76 BPM | HEIGHT: 75 IN | BODY MASS INDEX: 32.4 KG/M2 | OXYGEN SATURATION: 95 % | SYSTOLIC BLOOD PRESSURE: 121 MMHG

## 2023-04-18 DIAGNOSIS — R10.84 GENERALIZED ABDOMINAL PAIN: ICD-10-CM

## 2023-04-18 DIAGNOSIS — G47.00 INSOMNIA, UNSPECIFIED TYPE: Primary | ICD-10-CM

## 2023-04-18 DIAGNOSIS — F41.1 GAD (GENERALIZED ANXIETY DISORDER): ICD-10-CM

## 2023-04-18 DIAGNOSIS — F33.1 MODERATE EPISODE OF RECURRENT MAJOR DEPRESSIVE DISORDER: ICD-10-CM

## 2023-04-18 RX ORDER — TRAZODONE HYDROCHLORIDE 50 MG/1
50 TABLET ORAL NIGHTLY
Qty: 30 TABLET | Refills: 0 | Status: SHIPPED | OUTPATIENT
Start: 2023-04-18 | End: 2023-05-18

## 2023-04-18 NOTE — PROGRESS NOTES
"Subjective     CHIEF COMPLAINT    Chief Complaint   Patient presents with   • Fatigue     Pt states he can not sleep. Ongoing for a month.    • Dizziness     Ongoing for a month.    • Abdominal Pain     X 2 days. Pt denies diarrhea, vomiting, and nausea.             History of Present Illness  This is a 47-year-old male presenting to the clinic with multiple complaints.  His main concern is some abdominal pain he has had for the last couple of days-he states a week at most.  He describes it as an intermittent \"discomfort\" that is centrally located in his abdomen.  He states it is not a cramping or stabbing pain.  He has no associated diarrhea, bloating, or nausea and vomiting.  He states it does seem to improve with movement.  He is very concerned about a possible parasite and repeatedly asked to be tested for this.  He has city water and has not been traveling out of the country recently.  Denies any known exposures to parasites.    He additionally complains of some intermittent lightheadedness.  He states this occurs mostly when going from a seated to standing position.  He does not feel like he is going to pass out and he denies any movement sensation or \"room spinning.\"    Finally he reports he has had some difficulty sleeping for the last several months.  He states he has not been using his CPAP because the mask makes him feel bloated.  He also has noted he has been grinding his teeth.    Of note, he reports stopping his Cymbalta about 5 days ago which is around the same time the abdominal pain and lightheadedness began.  When I mentioned that this could be contributing to his symptoms he endorsed concern that there was something else going on and again mentioned the parasites.            Review of Systems   Constitutional: Negative for chills and fever.   Gastrointestinal: Positive for abdominal pain. Negative for blood in stool, constipation, diarrhea, nausea and vomiting.   Neurological: Positive for " "light-headedness. Negative for dizziness, syncope, facial asymmetry, speech difficulty, weakness, numbness and headaches.   Psychiatric/Behavioral: Positive for sleep disturbance.            Past Medical History:   Diagnosis Date   • Anxiety    • Depression    • Hypertension             History reviewed. No pertinent surgical history.         Family History   Problem Relation Age of Onset   • Leukemia Mother             Social History     Socioeconomic History   • Marital status:    Tobacco Use   • Smoking status: Never   • Smokeless tobacco: Never   Vaping Use   • Vaping Use: Never used   Substance and Sexual Activity   • Alcohol use: Yes     Comment: 1x weekly, 2-3 beers or bourbon drinks   • Drug use: Defer   • Sexual activity: Yes     Partners: Female            No Known Allergies         Current Outpatient Medications on File Prior to Visit   Medication Sig Dispense Refill   • amLODIPine (NORVASC) 10 MG tablet Take 1 tablet by mouth Daily. 90 tablet 1   • losartan (COZAAR) 100 MG tablet Take 1 tablet by mouth Daily. 90 tablet 1   • [DISCONTINUED] ALPRAZolam XR (XANAX XR) 0.5 MG 24 hr tablet Take 1-2 tablets by mouth Daily As Needed.     • [DISCONTINUED] Cholecalciferol 50 MCG (2000 UT) tablet Vitamin D3 50 mcg (2,000 unit) tablet   Take 1 tablet every day by oral route. (Patient not taking: Reported on 4/18/2023)     • [DISCONTINUED] DULoxetine (CYMBALTA) 30 MG capsule Take 1 capsule by mouth Daily. (Patient not taking: Reported on 4/18/2023)       No current facility-administered medications on file prior to visit.            /86 (BP Location: Right arm, Patient Position: Sitting, Cuff Size: Adult)   Pulse 76   Temp 98.4 °F (36.9 °C) (Oral)   Ht 190.5 cm (75\")   Wt 118 kg (260 lb 9.6 oz)   SpO2 95% Comment: room air  BMI 32.57 kg/m²          Objective     Physical Exam  Vitals and nursing note reviewed.   Constitutional:       General: He is not in acute distress.     Appearance: Normal " appearance.   Cardiovascular:      Rate and Rhythm: Normal rate and regular rhythm.      Heart sounds: Normal heart sounds.   Pulmonary:      Effort: Pulmonary effort is normal. No respiratory distress.      Breath sounds: Normal breath sounds.   Abdominal:      General: There is no distension.      Palpations: Abdomen is soft.      Tenderness: There is no abdominal tenderness.   Skin:     General: Skin is warm and dry.   Neurological:      Mental Status: He is alert and oriented to person, place, and time.   Psychiatric:         Mood and Affect: Mood normal.         Behavior: Behavior normal.              Procedures                    Lab Results (last 24 hours)     ** No results found for the last 24 hours. **                No Radiology Exams Resulted Within Past 24 Hours                    Diagnoses and all orders for this visit:    1. Insomnia, unspecified type (Primary)  Comments:  Trial of trazodone as below.  Follow-up with PCP scheduled.  Recommend he wear his CPAP to help with his sleep.  Orders:  -     traZODone (DESYREL) 50 MG tablet; Take 1 tablet by mouth Every Night for 30 days.  Dispense: 30 tablet; Refill: 0    2. Generalized abdominal pain  Comments:  No evidence of acute abdomen.  Will obtain stool studies per patient's request.  I suspect this is related to suddenly stopping his Cymbalta though.  Orders:  -     Giardia / Cryptosporidium Screen - Stool, Per Rectum; Future    3. CAPO (generalized anxiety disorder)  Comments:  Follow-up with PCP and/or psychiatry to discuss further management.  I do not feel a medication taper would be beneficial at this point.     4. Moderate episode of recurrent major depressive disorder             Additional Instructions for the Follow-ups that You Need to Schedule     Giardia / Cryptosporidium Screen - Stool, Per Rectum    Apr 18, 2023 (Approximate)      Release to patient: Routine Release                         FOR FULL DISCHARGE INSTRUCTIONS/COMMENTS/HANDOUTS  please see the   AVS

## 2023-04-20 ENCOUNTER — LAB (OUTPATIENT)
Dept: LAB | Facility: HOSPITAL | Age: 48
End: 2023-04-20
Payer: COMMERCIAL

## 2023-04-20 DIAGNOSIS — R10.84 GENERALIZED ABDOMINAL PAIN: ICD-10-CM

## 2023-04-20 PROCEDURE — 87505 NFCT AGENT DETECTION GI: CPT

## 2023-04-21 LAB
CRYPTOSP DNA STL QL NAA+NON-PROBE: NOT DETECTED
E HISTOLYT DNA STL QL NAA+NON-PROBE: NOT DETECTED
G LAMBLIA DNA STL QL NAA+NON-PROBE: NOT DETECTED

## 2023-05-08 ENCOUNTER — OFFICE VISIT (OUTPATIENT)
Dept: FAMILY MEDICINE CLINIC | Age: 48
End: 2023-05-08
Payer: COMMERCIAL

## 2023-05-08 VITALS
DIASTOLIC BLOOD PRESSURE: 73 MMHG | HEART RATE: 66 BPM | BODY MASS INDEX: 31.95 KG/M2 | HEIGHT: 75 IN | WEIGHT: 257 LBS | SYSTOLIC BLOOD PRESSURE: 115 MMHG

## 2023-05-08 DIAGNOSIS — G47.33 OBSTRUCTIVE SLEEP APNEA SYNDROME: ICD-10-CM

## 2023-05-08 DIAGNOSIS — R42 LIGHT HEADEDNESS: ICD-10-CM

## 2023-05-08 DIAGNOSIS — I10 PRIMARY HYPERTENSION: Primary | ICD-10-CM

## 2023-05-08 DIAGNOSIS — G47.00 INSOMNIA, UNSPECIFIED TYPE: ICD-10-CM

## 2023-05-08 RX ORDER — DULOXETIN HYDROCHLORIDE 30 MG/1
30 CAPSULE, DELAYED RELEASE ORAL DAILY
COMMUNITY
Start: 2023-05-03

## 2023-05-08 RX ORDER — LISINOPRIL 40 MG/1
40 TABLET ORAL DAILY
Qty: 90 TABLET | Refills: 1 | Status: SHIPPED | OUTPATIENT
Start: 2023-05-08

## 2023-05-08 NOTE — ASSESSMENT & PLAN NOTE
Discussed with patient that insomnia is likely multifactorial considering his anxiety disorder and obstructive sleep apnea.  He was encouraged to start lisinopril first for his blood pressure.  After he has been on lisinopril for 2 weeks, he was instructed to try trazodone nightly as previously prescribed.  Recommend he follow-up with psychiatry.

## 2023-05-08 NOTE — ASSESSMENT & PLAN NOTE
We will stop losartan as he believes this may be causing him to feel lightheaded, start lisinopril 40 mg once per day.  Patient was educated on lisinopril side effects.  Patient would like to discontinue blood pressure medications completely.  Discussed with patient that blood pressure medication cessation is not recommended in fear that his blood pressure would become uncontrolled.  Discussed low-salt diet and weight loss to help with lowering blood pressure nonpharmacologically.  Continue amlodipine 10 mg once per day.  Follow-up in 1 month for reassessment.

## 2023-05-08 NOTE — PROGRESS NOTES
"Robbin Myers presents to [facility] with complaint of  Insomnia (Follow up, pt states he has not started taking the Trazodone, he is worried about taking medication. )    SUBJECTIVE  History of Present Illness     Patient is here for follow-up to discuss insomnia.  He saw Cristin Mcgee on April 18 for insomnia and was given a prescription for trazodone 50 mg.  At that time, he had also stopped taking his Cymbalta.  Today he tells me he has resumed his Cymbalta and can see an improvement overall.  His mental health issues are managed by psychiatrist in [place].  He also reports that he has not used Xanax in over a month.  He also has obstructive sleep apnea and is noncompliant with CPAP.  Patient says he feels like he cannot get the mask to fit correctly and whenever he does wear it makes him feel bloated.  He has had his machine adjusted multiple times he says.    He also wants to discuss feeling lightheaded.  He says he believes this may be one of his blood pressure medications doing this.  Lightheadedness has been going on for the past several months.  He denies any headaches or vision changes.  He denies any heart palpitations or syncopal events.  He is currently on amlodipine 10 mg once per day and losartan 100 mg once per day.  He would like to switch to a different blood pressure medication.  He ultimately says he wants to come off blood pressure medication completely.    OBJECTIVE  Vital Signs:   /73 (BP Location: Left arm, Patient Position: Sitting)   Pulse 66   Ht 190.5 cm (75\")   Wt 117 kg (257 lb)   BMI 32.12 kg/m²       Physical Exam  Constitutional:       General: He is not in acute distress.     Appearance: Normal appearance. He is obese. He is not ill-appearing.   HENT:      Head: Normocephalic and atraumatic.      Nose: Nose normal.      Mouth/Throat:      Pharynx: Oropharynx is clear.   Cardiovascular:      Rate and Rhythm: Normal rate and regular " rhythm.      Pulses: Normal pulses.      Heart sounds: Normal heart sounds.   Pulmonary:      Effort: Pulmonary effort is normal. No respiratory distress.      Breath sounds: Normal breath sounds.   Chest:      Chest wall: No tenderness.   Musculoskeletal:         General: Normal range of motion.      Cervical back: Normal range of motion and neck supple.   Skin:     General: Skin is warm and dry.   Neurological:      General: No focal deficit present.      Mental Status: He is alert and oriented to person, place, and time. Mental status is at baseline.   Psychiatric:         Mood and Affect: Mood normal.         Behavior: Behavior normal.        ASSESSMENT AND PLAN:  Diagnoses and all orders for this visit:    1. Primary hypertension (Primary)  Assessment & Plan:  We will stop losartan as he believes this may be causing him to feel lightheaded, start lisinopril 40 mg once per day.  Patient was educated on lisinopril side effects.  Patient would like to discontinue blood pressure medications completely.  Discussed with patient that blood pressure medication cessation is not recommended in fear that his blood pressure would become uncontrolled.  Discussed low-salt diet and weight loss to help with lowering blood pressure nonpharmacologically.  Continue amlodipine 10 mg once per day.  Follow-up in 1 month for reassessment.    Orders:  -     lisinopril (PRINIVIL,ZESTRIL) 40 MG tablet; Take 1 tablet by mouth Daily.  Dispense: 90 tablet; Refill: 1    2. Insomnia, unspecified type  Assessment & Plan:  Discussed with patient that insomnia is likely multifactorial considering his anxiety disorder and obstructive sleep apnea.  He was encouraged to start lisinopril first for his blood pressure.  After he has been on lisinopril for 2 weeks, he was instructed to try trazodone nightly as previously prescribed.  Recommend he follow-up with psychiatry.      3. Light headedness    4. Obstructive sleep apnea syndrome  Assessment &  Plan:  Does not wear CPAP.  Likely contributing to his insomnia.          Follow Up   Return in about 1 month (around 6/8/2023). Patient to notify office with any acute concerns or issues.  Patient verbalizes understanding, agrees with plan of care and has no further questions upon discharge.     Patient was given instructions and counseling regarding his condition or for health maintenance advice. Please see specific information pulled into the AVS if appropriate.     Discussed the importance of following up with any needed screening tests/labs/specialist appointments and any requested follow-up recommended by me today. Importance of maintaining follow-up discussed and patient accepts that missed appointments can delay diagnosis and potentially lead to worsening of conditions.    Part of this note may be an electronic transcription/translation of spoken language to printed text using the Dragon Dictation System.

## 2023-05-13 DIAGNOSIS — I10 PRIMARY HYPERTENSION: ICD-10-CM

## 2023-05-15 RX ORDER — AMLODIPINE BESYLATE 10 MG/1
10 TABLET ORAL DAILY
Qty: 90 TABLET | Refills: 1 | Status: SHIPPED | OUTPATIENT
Start: 2023-05-15

## 2023-06-13 ENCOUNTER — TELEPHONE (OUTPATIENT)
Dept: FAMILY MEDICINE CLINIC | Age: 48
End: 2023-06-13
Payer: COMMERCIAL

## 2023-06-13 NOTE — TELEPHONE ENCOUNTER
6/12/23 8:00 1ST DOCUMENTED NO SHOW. CALLED PT [facility]. INFORMED OF NS POLICY AND SENT LETTER. PASTORA

## 2023-10-24 DIAGNOSIS — I10 PRIMARY HYPERTENSION: ICD-10-CM

## 2023-10-25 RX ORDER — AMLODIPINE BESYLATE 10 MG/1
10 TABLET ORAL DAILY
Qty: 30 TABLET | Refills: 0 | Status: SHIPPED | OUTPATIENT
Start: 2023-10-25

## 2023-10-25 RX ORDER — LISINOPRIL 40 MG/1
40 TABLET ORAL DAILY
Qty: 30 TABLET | Refills: 0 | Status: SHIPPED | OUTPATIENT
Start: 2023-10-25